# Patient Record
Sex: FEMALE | Race: WHITE | NOT HISPANIC OR LATINO | Employment: OTHER | URBAN - METROPOLITAN AREA
[De-identification: names, ages, dates, MRNs, and addresses within clinical notes are randomized per-mention and may not be internally consistent; named-entity substitution may affect disease eponyms.]

---

## 2018-06-06 ENCOUNTER — OFFICE VISIT (OUTPATIENT)
Dept: OPHTHALMOLOGY | Facility: CLINIC | Age: 73
End: 2018-06-06
Attending: OPHTHALMOLOGY
Payer: MEDICARE

## 2018-06-06 ENCOUNTER — TELEPHONE (OUTPATIENT)
Dept: OPHTHALMOLOGY | Facility: CLINIC | Age: 73
End: 2018-06-06

## 2018-06-06 DIAGNOSIS — H25.13 NUCLEAR SCLEROTIC CATARACT OF BOTH EYES: Primary | ICD-10-CM

## 2018-06-06 PROCEDURE — G0463 HOSPITAL OUTPT CLINIC VISIT: HCPCS | Mod: ZF

## 2018-06-06 ASSESSMENT — VISUAL ACUITY
OS_PH_SC: 20/150
OD_SC: HM
METHOD: SNELLEN - LINEAR
OS_SC: 20/400

## 2018-06-06 ASSESSMENT — EXTERNAL EXAM - RIGHT EYE: OD_EXAM: NORMAL

## 2018-06-06 ASSESSMENT — REFRACTION_MANIFEST
OS_SPHERE: -2.50
OS_AXIS: 075
OS_ADD: +2.75
OS_CYLINDER: +0.25
OD_ADD: +2.75

## 2018-06-06 ASSESSMENT — EXTERNAL EXAM - LEFT EYE: OS_EXAM: NORMAL

## 2018-06-06 ASSESSMENT — TONOMETRY
IOP_METHOD: ICARE
OD_IOP_MMHG: 10
OS_IOP_MMHG: 10

## 2018-06-06 ASSESSMENT — CONF VISUAL FIELD
OD_NORMAL: 1
OS_NORMAL: 1

## 2018-06-06 ASSESSMENT — SLIT LAMP EXAM - LIDS
COMMENTS: NORMAL
COMMENTS: NORMAL
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
## 2018-06-06 NOTE — TELEPHONE ENCOUNTER
Note to Dr. Early/facilitator for f/u confidential callback per pt request below  Devante Moncada RN 1:23 PM 06/06/18

## 2018-06-06 NOTE — LETTER
Dear ,      Thank you for coming to see me at the [facility].       I received your packet of medical records. In regards to your care, as discussed during your visit to my clinic on June 6th, I am not able to do surgery on your cataract as we do not accept your insurance.   Please find an ophthalmologist in your area who is able to perform your cataract surgery based upon my findings during your visit.      I have enclosed a release of information for your records once you are able to establish care with another ophthalmologist. If your insurance does change, please feel free to schedule another appointment.       Respectfully,

## 2018-06-06 NOTE — MR AVS SNAPSHOT
After Visit Summary   6/6/2018    Jada Latham    MRN: [id number]           Patient Information     Date Of Birth          1945        Visit Information        Provider Department      6/6/2018 7:30 AM Aletha Early MD [facility]        Today's Diagnoses     Nuclear sclerotic cataract of both eyes    -  1       Follow-ups after your visit        Follow-up notes from your care team     Return if symptoms worsen or fail to improve.      Who to contact     Please call your clinic at [phone] to:    Ask questions about your health    Make or cancel appointments    Discuss your medicines    Learn about your test results    Speak to your doctor            Additional Information About Your Visit        Care EveryWhere ID     This is your Care EveryWhere ID. This could be used by other organizations to access your [facility] medical records  [id number]         Blood Pressure from Last 3 Encounters:   No data found for BP    Weight from Last 3 Encounters:   No data found for Wt              Today, you had the following     No orders found for display       Primary Care Provider Fax #    Physician No Ref-Primary [phone]       No address on file        Equal Access to Services     RASTA VITALE : Hadii aad ku hadasho Soomaali, waaxda luqadaha, qaybta kaalmada adeegyada, waxay bonniein haychaz mustafa . [facility] [phone].    ATENCIÓN: Si habla español, tiene a da silva disposición servicios gratuitos de asistencia lingüística. Llame al [phone].    We comply with applicable federal civil rights laws and [place] laws. We do not discriminate on the basis of race, color, national origin, age, disability, sex, sexual orientation, or gender identity.            Thank you!     Thank you for choosing EYE CLINIC  for your care. Our goal is always to provide you with excellent care. Hearing back from our patients is one way we can continue to improve our services. Please take a few minutes  to complete the written survey that you may receive in the mail after your visit with us. Thank you!             Your Updated Medication List - Protect others around you: Learn how to safely use, store and throw away your medicines at www.disposemymeds.org.      Notice  As of 6/6/2018  8:56 AM    You have not been prescribed any medications.
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
## 2018-06-06 NOTE — TELEPHONE ENCOUNTER
[facility] Call Center    Phone Message    May a detailed message be left on voicemail: no    Reason for Call: Other: Pt was in to see Dr. Early and would like to talk to her. Would not tell me anything about what it is regarding, stating it was confidental. Pt is staying at a hotel and wants us to call at [phone] and ask for room 216. Thanks      Action Taken: Message routed to:  [facility] Clinic

## 2018-06-06 NOTE — LETTER
Dear Ms. Latham,      Thank you for coming to see me at the [facility].       I received your packet of medical records. In regards to your care, as discussed during your visit to my clinic on June 6th, I am not able to do surgery on your cataract as we do not accept your insurance.   Please find an ophthalmologist in your area who is able to perform your cataract surgery based upon my findings during your visit.    I have enclosed a release of information for your records once you are able to establish care with another ophthalmologist. If your insurance does change, please feel free to schedule another appointment.       Respectfully,         Dr. Aletha Early

## 2018-06-07 NOTE — TELEPHONE ENCOUNTER
[facility] Call Center    Phone Message    May a detailed message be left on voicemail: no    Reason for Call: Other: Pt called again requesting a call back before 10am      Action Taken: Message routed to:  [facility] EYE

## 2018-06-07 NOTE — TELEPHONE ENCOUNTER
Spoke to pt at [phone]    Pt has 8 requirements before scheduling cataract     States needs to review with Dr. early     Pt has list should would like to review      Pt will be at hotel until tomorrow AM likely and would prefer call thru them rather than home number later on    Prefers this afternoon/evening     Note to Dr. Early to assist in callback    Devante Moncada RN 9:56 AM 06/07/18    [facility] number [phone] and ask for Room #216

## 2018-06-07 NOTE — TELEPHONE ENCOUNTER
[facility] Call Center    Phone Message    May a detailed message be left on voicemail: no - Pt staying in hotel and need to request room 216    Reason for Call: Other: Pt following up on message left yesterday. Hasn't heard back from Dr. Early and is requesting call back before she leaves [facility] today at 10:00a. Please call her at her hotel at [phone] and ask for Room #216.     Action Taken: Message routed to:  [facility] Clinic

## 2018-06-13 ENCOUNTER — TELEPHONE (OUTPATIENT)
Dept: OPHTHALMOLOGY | Facility: CLINIC | Age: 73
End: 2018-06-13

## 2018-06-13 NOTE — TELEPHONE ENCOUNTER
[facility] Call Center    Phone Message    May a detailed message be left on voicemail: yes    Reason for Call: Other: Pt said she sent a package to Dr Early and wants to make sure she gets it. If she doesn't get a package by tomramez, please call Pt to let her know. Pt mailed it to the wrong address, she mailed it to the physical address for the Eye Clinic, and not the mailing address for the eye clinic. She requests a call back only if you do not receive the package. Thanks!     Action Taken: Message routed to:  [facility] Clinic

## 2018-06-14 ENCOUNTER — TELEPHONE (OUTPATIENT)
Dept: OPHTHALMOLOGY | Facility: CLINIC | Age: 73
End: 2018-06-14

## 2018-06-14 NOTE — TELEPHONE ENCOUNTER
[facility] Call Center    Phone Message    May a detailed message be left on voicemail: yes    Reason for Call: Other: Pt would like to schedule consult for cataract surgery, as well as surgery and post-op. Pt is requesting soonest possible and all as close together.     Action Taken: Message routed to:  [facility] Ophthamology

## 2018-06-14 NOTE — TELEPHONE ENCOUNTER
Note to surgery scheduler for scheduling cataract surgery  Devante Moncada RN 4:54 PM 06/14/18

## 2019-07-17 ENCOUNTER — TELEPHONE (OUTPATIENT)
Dept: OPHTHALMOLOGY | Facility: CLINIC | Age: 74
End: 2019-07-17
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
## 2019-09-11 ENCOUNTER — TRANSFERRED RECORDS (OUTPATIENT)
Dept: HEALTH INFORMATION MANAGEMENT | Facility: CLINIC | Age: 74
End: 2019-09-11

## 2019-11-07 ENCOUNTER — TRANSFERRED RECORDS (OUTPATIENT)
Dept: HEALTH INFORMATION MANAGEMENT | Facility: CLINIC | Age: 74
End: 2019-11-07

## 2020-01-13 DIAGNOSIS — H26.8 MATURE CATARACT: Primary | ICD-10-CM

## 2020-01-14 ENCOUNTER — OFFICE VISIT (OUTPATIENT)
Dept: OPHTHALMOLOGY | Facility: CLINIC | Age: 75
End: 2020-01-14
Attending: OPHTHALMOLOGY
Payer: COMMERCIAL

## 2020-01-14 DIAGNOSIS — H26.8 MATURE CATARACT: Primary | ICD-10-CM

## 2020-01-14 DIAGNOSIS — H25.812 COMBINED FORM OF AGE-RELATED CATARACT, LEFT EYE: ICD-10-CM

## 2020-01-14 DIAGNOSIS — H26.8 MATURE CATARACT: ICD-10-CM

## 2020-01-14 PROCEDURE — G0463 HOSPITAL OUTPT CLINIC VISIT: HCPCS | Mod: ZF

## 2020-01-14 PROCEDURE — 76512 OPH US DX B-SCAN: CPT | Mod: ZF | Performed by: OPHTHALMOLOGY

## 2020-01-14 PROCEDURE — 76519 ECHO EXAM OF EYE: CPT | Mod: ZF | Performed by: OPHTHALMOLOGY

## 2020-01-14 ASSESSMENT — EXTERNAL EXAM - LEFT EYE: OS_EXAM: NORMAL

## 2020-01-14 ASSESSMENT — SLIT LAMP EXAM - LIDS
COMMENTS: NORMAL
COMMENTS: NORMAL

## 2020-01-14 ASSESSMENT — CONF VISUAL FIELD
OD_SUPERIOR_NASAL_RESTRICTION: 1
OS_SUPERIOR_NASAL_RESTRICTION: 1
OS_INFERIOR_NASAL_RESTRICTION: 1
OS_SUPERIOR_TEMPORAL_RESTRICTION: 1
OS_INFERIOR_TEMPORAL_RESTRICTION: 1
METHOD: COUNTING FINGERS
OD_SUPERIOR_TEMPORAL_RESTRICTION: 1
OD_INFERIOR_NASAL_RESTRICTION: 1
OD_INFERIOR_TEMPORAL_RESTRICTION: 1

## 2020-01-14 ASSESSMENT — TONOMETRY
OS_IOP_MMHG: 14
IOP_METHOD: TONOPEN
OD_IOP_MMHG: 12

## 2020-01-14 ASSESSMENT — VISUAL ACUITY
METHOD: SNELLEN - LINEAR
OD_SC: HM

## 2020-01-14 ASSESSMENT — EXTERNAL EXAM - RIGHT EYE: OD_EXAM: NORMAL

## 2020-01-14 NOTE — PROGRESS NOTES
LEANN  Jada Latham is a 74 year old female here for evaluation of blurred vision. She states her vision has progressively been getting worse over the last several years. A few years ago, her vision was ok to drive, but it is has not been good enough to drive for at least hte last 2 years. She denies pain, redness, discharge. No flashes/floaters.    POH: No history of eye surgery  PMH: Bipolar/schizoaffective  FH: Mother with floaters    Assessment & Plan      (H25.13) Nuclear sclerotic cataract of both eyes  (primary encounter diagnosis)  Comment: Visually significant with BCVA of HM right eye and CF left eye with mature cataract right eye and very dense advanced NS and PSC left eye. B-scan echography done today both eyes due to no view to posterior segment right eye and poor view left eye. Retina is attached and no masses. Question of optic nerve cupping left eye.     Dilates to: 7.5 mm  Alpha blockers/Flomax: None  Trauma/Pseudoxfoliation: None  Fuchs dystrophy/guttae: None    Diabetes: No  Anticoagulation: None    Cyl: No toric    We discussed the risks and benefits of cataract surgery in the right eye, and informed consent was obtained. Discussed that without a view to the posterior segment of the eye, retina and optic nerve issues may limit her vision after surgery despite cataract removal.    Proceed with CE/IOL right eye to start. Needs left eye as well, but will consider after right eye done and healed.    Surgical plan:  RB/MAC (due to psychiatric history and very dense mature lens)  Trypan    She has no one to be with her or stay with her for 24 hours after surgery.  I talked with Social Work who is going to start looking into options for her. If inpatient observation is an option, can schedule her surgery at [facility].     -----------------------------------------------------------------------------------    Patient disposition:   Return for scheduled procedure.    Teaching statement:  Complete  documentation of historical and exam elements from today's encounter can be found in the full encounter summary report (not reduplicated in this progress note). I personally obtained the chief complaint(s) and history of present illness.  I confirmed and edited as necessary the review of systems, past medical/surgical history, family history, social history, and examination findings as documented by others; and I examined the patient myself. I personally reviewed the relevant tests, images, and reports as documented above.     I formulated and edited as necessary the assessment and plan and discussed the findings and management plan with the patient and family.    Aletha Early MD  Comprehensive Ophthalmology & Ocular Pathology  Department of Ophthalmology and Visual Neurosciences  [email or web address]  Pager [phone]

## 2020-01-14 NOTE — NURSING NOTE
Chief Complaints and History of Present Illnesses   Patient presents with     Cataract     Chief Complaint(s) and History of Present Illness(es)     Cataract     Laterality: both eyes    Associated symptoms: blurred vision.  Negative for glare and haloes    Severity: severe    Frequency: constant    Course: gradually worsening    Treatments tried: no treatments              Comments     Pt complains of not being able to see much out of her RE, LE gradually decreasing.  Denies any glare, halos, flashes, pain, pressure, irritation, discharge, and tearing.  Does not use any eye medications at this time.    Slime Muñoz OT 11:15 AM January 14, 2020

## 2020-01-17 ENCOUNTER — TELEPHONE (OUTPATIENT)
Dept: OPHTHALMOLOGY | Facility: CLINIC | Age: 75
End: 2020-01-17

## 2020-01-20 ENCOUNTER — TELEPHONE (OUTPATIENT)
Dept: OPHTHALMOLOGY | Facility: CLINIC | Age: 75
End: 2020-01-20

## 2020-01-20 ENCOUNTER — HOSPITAL ENCOUNTER (OUTPATIENT)
Facility: CLINIC | Age: 75
End: 2020-01-20
Attending: OPHTHALMOLOGY | Admitting: OPHTHALMOLOGY
Payer: COMMERCIAL

## 2020-01-20 DIAGNOSIS — H26.8 MATURE CATARACT: ICD-10-CM

## 2020-01-20 DIAGNOSIS — H25.13 AGE-RELATED NUCLEAR CATARACT OF BOTH EYES: Primary | ICD-10-CM

## 2020-01-20 NOTE — TELEPHONE ENCOUNTER
Patient is scheduled for surgery with Dr. Dr. Aletha Early     Spoke with: Jada     Date of Surgery: 02/06/20     Location: [facility]     Informed patient they will need an adult : Yes     H&P will be completed at: [facility] Clinic 01/21     Post Op scheduled on 02/24/20      Surgery packet was given in clinic     Additional comments:Provided [facility] Pre-Admission RN's number ([phone],) pt is skeptical of technology and does not accept incoming calls, so she can contact them 1 - 2 business days prior for arrival time and instructions.

## 2020-01-20 NOTE — TELEPHONE ENCOUNTER
FUTURE VISIT INFORMATION      SURGERY INFORMATION:    Date: 2.6.2020    Location: [facility]    Surgeon: Dr. Early    Anesthesia Type combines MAC with Retrobulbar     Procedure: right cataract removal with intraocular lens implant    Consult: 1.14.2020- OV with Dr. Early        RECORDS REQUESTED FROM:       Primary Care Provider:    Most recent EKG with tracings/strips: 7.10.12-report in Care Everywhere

## 2020-01-21 ENCOUNTER — PRE VISIT (OUTPATIENT)
Dept: SURGERY | Facility: CLINIC | Age: 75
End: 2020-01-21

## 2020-01-21 ENCOUNTER — OFFICE VISIT (OUTPATIENT)
Dept: SURGERY | Facility: CLINIC | Age: 75
End: 2020-01-21

## 2020-01-21 ENCOUNTER — ANESTHESIA EVENT (OUTPATIENT)
Dept: SURGERY | Facility: CLINIC | Age: 75
End: 2020-01-21

## 2020-01-21 VITALS
WEIGHT: 219.5 LBS | BODY MASS INDEX: 36.57 KG/M2 | HEIGHT: 65 IN | HEART RATE: 64 BPM | SYSTOLIC BLOOD PRESSURE: 123 MMHG | DIASTOLIC BLOOD PRESSURE: 75 MMHG | TEMPERATURE: 98.3 F | RESPIRATION RATE: 18 BRPM | OXYGEN SATURATION: 93 %

## 2020-01-21 DIAGNOSIS — Z01.818 PREOP EXAMINATION: Primary | ICD-10-CM

## 2020-01-21 PROBLEM — H52.13 MYOPIA OF BOTH EYES WITH ASTIGMATISM AND PRESBYOPIA: Status: ACTIVE | Noted: 2018-02-25

## 2020-01-21 PROBLEM — H52.203 MYOPIA OF BOTH EYES WITH ASTIGMATISM AND PRESBYOPIA: Status: ACTIVE | Noted: 2018-02-25

## 2020-01-21 PROBLEM — H04.129 DRY EYES DUE TO DECREASED TEAR PRODUCTION: Status: ACTIVE | Noted: 2017-12-21

## 2020-01-21 PROBLEM — H25.9 AGE-RELATED CATARACT OF BOTH EYES: Status: ACTIVE | Noted: 2017-06-27

## 2020-01-21 PROBLEM — R73.03 PREDIABETES: Status: ACTIVE | Noted: 2018-05-01

## 2020-01-21 PROBLEM — H52.4 MYOPIA OF BOTH EYES WITH ASTIGMATISM AND PRESBYOPIA: Status: ACTIVE | Noted: 2018-02-25

## 2020-01-21 RX ORDER — IBUPROFEN 200 MG
400 TABLET ORAL EVERY 6 HOURS PRN
COMMUNITY
End: 2021-07-21

## 2020-01-21 ASSESSMENT — MIFFLIN-ST. JEOR: SCORE: 1496.53

## 2020-01-21 NOTE — ANESTHESIA PREPROCEDURE EVALUATION
Anesthesia Pre-Procedure Evaluation    Patient: Jada Latham   MRN:     [id number] Gender:   female   Age:    74 year old :      1945        Preoperative Diagnosis: * No surgery found *        No past medical history on file.   No past surgical history on file.       Anesthesia Evaluation     . Pt has had prior anesthetic. Type of anesthetic: only anesthesia was as a child, 4 yo, tonsillectomy.    No history of anesthetic complications          ROS/MED HX    ENT/Pulmonary:  - neg pulmonary ROS     Neurologic:  - neg neurologic ROS     Cardiovascular:  - neg cardiovascular ROS   (+) ----. : . . . :. . Previous cardiac testing date:results:date: results:ECG reviewed date: results: date: results:          METS/Exercise Tolerance:  >4 METS   Hematologic:  - neg hematologic  ROS       Musculoskeletal:   (+) fracture lower extremity: Ankle, -       GI/Hepatic:  - neg GI/hepatic ROS       Renal/Genitourinary:  - ROS Renal section negative       Endo:     (+) Obesity, .      Psychiatric:     (+) psychiatric history other (comment) (schizoaffective disorder/bipolar disorder)      Infectious Disease:  - neg infectious disease ROS       Malignancy:      - no malignancy   Other:    (+) No chance of pregnancy no H/O Chronic Pain,                       PHYSICAL EXAM:   Mental Status/Neuro: A/A/O; Age Appropriate   Airway: Facies: Feasible  Mallampati: II  Mouth/Opening: Full  TM distance: > 6 cm  Neck ROM: Full   Respiratory: Auscultation: CTAB     Resp. Rate: Normal     Resp. Effort: Normal      CV: Rhythm: Regular  Rate: Age appropriate  Heart: Normal Sounds  Edema: None   Comments:      Dental: Normal Dentition                LABS:  CBC: No results found for: WBC, HGB, HCT, PLT  BMP: No results found for: NA, POTASSIUM, CHLORIDE, CO2, BUN, CR, GLC  COAGS: No results found for: PTT, INR, FIBR  POC: No results found for: BGM, HCG, HCGS  OTHER: No results found for: PH, LACT, A1C, GOMEZ, PHOS, MAG, ALBUMIN,  PROTTOTAL, ALT, AST, GGT, ALKPHOS, BILITOTAL, BILIDIRECT, LIPASE, AMYLASE, PRADEEP, TSH, T4, T3, CRP, SED     Preop Vitals    BP Readings from Last 3 Encounters:   No data found for BP    Pulse Readings from Last 3 Encounters:   No data found for Pulse      Resp Readings from Last 3 Encounters:   No data found for Resp    SpO2 Readings from Last 3 Encounters:   No data found for SpO2      Temp Readings from Last 1 Encounters:   No data found for Temp    Ht Readings from Last 1 Encounters:   No data found for Ht      Wt Readings from Last 1 Encounters:   No data found for Wt    There is no height or weight on file to calculate BMI.     LDA:        JUAN MANUEL FV AN PLAN NO PONV RULE       PAC Discussion and Assessment    ASA Classification: 2  Case is suitable for: [place]  Anesthetic techniques and relevant risks discussed: MAC with GA as backup  Invasive monitoring and risk discussed: No  Types:   Possibility and Risk of blood transfusion discussed: No  NPO instructions given:   Additional anesthetic preparation and risks discussed:   Needs early admission to pre-op area:   Other:     PAC Resident/NP Anesthesia Assessment:  Jada Latham is a 74 year old female scheduled for RIGHT CATARACT REMOVAL WITH INTRAOCULAR LENS IMPLANT on 2/6/2020 by Dr. Early in treatment of mature cataract.  PAC referral for risk assessment and optimization for anesthesia with comorbid conditions of obesity:    Pre-operative considerations:  1.  Cardiac:  Functional status- METS >4.  Pt states she walks most places and approximately 2 hours per day.  Low risk surgery with 0.4% (RCRI 0) risk of major adverse cardiac event.   -denies CP, SOB, CHAVEZ, palpitations, lightheadedness, syncope  -denies cardiac history    2.  Pulm:  Airway feasible.  CHRISTOPHER risk:  Low, STOPBANG 2/8  -former smoker    3.  GI:  Risk of PONV score = 2.  If > 2, anti-emetic intervention recommended.    VTE risk: 0.5%         **For further details of assessment, testing, and  physical exam please see H and P completed on same date.          Sarai Coy PA-C, [facility]      Reviewed and Signed by PAC Mid-Level Provider/Resident  Mid-Level Provider/Resident: Sarai Coy  Date: 1/21/2020  Time:     Attending Anesthesiologist Anesthesia Assessment:        Anesthesiologist:   Date:   Time:   Pass/Fail:   Disposition:     PAC Pharmacist Assessment:        Pharmacist:   Date:   Time:    Sarai Coy PA-C

## 2020-01-21 NOTE — PATIENT INSTRUCTIONS
Preparing for Your Surgery      Name:  Jada Latham   MRN:  [id number]   :  1945   Today's Date:  2020     Arriving for surgery:   Surgery date:   2020  Arrival time:  5:30 am  Please come to:     [facility] [address]  [address] [place]  [address]    - parking is available in front of [facility] from 5:15AM to 8:00PM. If you prefer, park your car in the Green Lot.    -Proceed to the 3rd floor, check in at the Adult Surgery Waiting Lounge. [phone]    If an escort is needed stop at the Information Desk in the lobby. Inform the information person that you are here for surgery. An escort to the Adult Surgery Waiting Lounge will be provided.    What can I eat or drink?  -  You may have solid food or milk products until 8 hours prior to your surgery.(20 at 11 pm)  -  You may have water, apple juice or 7up/Sprite until 2 hours prior to your surgery.(unyil 5:30 am arrival time)    Which medicines can I take?          Stop Aspirin, vitamins and supplements one week prior to surgery.          Hold Ibuprofen for 24 hours and/or Naproxen for 48 hours prior to surgery.       -  Please take these medications the day of surgery:  NONE      How do I prepare myself?  -  Take two showers: one the night before surgery; and one the morning of surgery.         Use Scrubcare or Hibiclens to wash from neck down, leave soap on your skin for up to one minute.       Do not get soap in your eyes or ears.  You may use your own shampoo and conditioner; no other hair products.   -  Do NOT use lotion, powder, deodorant, or antiperspirant the day of your surgery.  -  Do NOT wear any makeup, fingernail polish or jewelry.  - Do not bring your own medications to the hospital, except for inhalers and eye   drops.  -  Bring your ID and insurance card.      -If you are scheduled to go home the Same Day as surgery you must have a responsible adult as a   and to stay with you overnight the first 24 hours after surgery.     Questions or Concerns:    -Please call the Pre Admission Nursing Office at [phone].       -If you have health changes between today and your surgery please call your surgeon.     For questions after surgery please call your surgeons office.
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
## 2020-01-30 ENCOUNTER — DOCUMENTATION ONLY (OUTPATIENT)
Dept: CARE COORDINATION | Facility: CLINIC | Age: 75
End: 2020-01-30

## 2020-01-30 NOTE — PROGRESS NOTES
Social Work Documentation-Only Note  [facility]    Patient Name:  Jdaa Latham  /Age:  1945 (74 year old)    Referral Source: Dr. Early in Ophthalmology Clinic   Reason for Referral:  Pre-surgery planning     Received message from MD that pt needs an eye surgery but does not have any supports for after the surgery. Pt did not want to be called by SW so was given SW contact info to call to discuss further. Did not hear from pt. Later received message from perioperative coordinator who said that pt did not want SW intervention, does not trust people calling her and would also not trust a [employer] cab to pick her up. She suggested letter outreach offering SW contact info. Sent letter to pt with SW contact info if she would like to discuss further.     LALI Nunes, [facility]  Clinical   Outpatient Speciality Clinics   [facility] [place]  Ph. [phone]    NO LETTER

## 2020-01-31 ENCOUNTER — PREP FOR PROCEDURE (OUTPATIENT)
Dept: OPHTHALMOLOGY | Facility: CLINIC | Age: 75
End: 2020-01-31

## 2020-02-05 ENCOUNTER — ANESTHESIA EVENT (OUTPATIENT)
Dept: SURGERY | Facility: CLINIC | Age: 75
End: 2020-02-05

## 2020-02-05 RX ORDER — DICLOFENAC SODIUM 1 MG/ML
1 SOLUTION/ DROPS OPHTHALMIC
Status: CANCELLED | OUTPATIENT
Start: 2020-02-05

## 2020-02-05 RX ORDER — SODIUM CHLORIDE, SODIUM LACTATE, POTASSIUM CHLORIDE, CALCIUM CHLORIDE 600; 310; 30; 20 MG/100ML; MG/100ML; MG/100ML; MG/100ML
INJECTION, SOLUTION INTRAVENOUS CONTINUOUS
Status: CANCELLED | OUTPATIENT
Start: 2020-02-05

## 2020-02-05 RX ORDER — OFLOXACIN 3 MG/ML
1 SOLUTION/ DROPS OPHTHALMIC
Status: CANCELLED | OUTPATIENT
Start: 2020-02-05

## 2020-02-05 RX ORDER — CYCLOPENTOLAT/TROPIC/PHENYLEPH 1%-1%-2.5%
1 DROPS (EA) OPHTHALMIC (EYE)
Status: CANCELLED | OUTPATIENT
Start: 2020-02-05

## 2020-02-05 NOTE — ANESTHESIA PREPROCEDURE EVALUATION
Anesthesia Pre-Procedure Evaluation    Patient: Jada Latham   MRN:     [id number] Gender:   female   Age:    74 year old :      1945        Preoperative Diagnosis: Mature cataract [H26.8]   Procedure(s):  RIGHT CATARACT REMOVAL WITH INTRAOCULAR LENS IMPLANT  Possible Left Eye Cataract Extraction With Intraocular Lens Placement     No past medical history on file.   Past Surgical History:   Procedure Laterality Date     TONSILLECTOMY      as a child          Anesthesia Evaluation    ROS/Med Hx    No history of anesthetic complications    Cardiovascular Findings - negative ROS    Neuro Findings   Comments: Restless legs syndrome (RLS)  Schizoaffective disorder, bipolar type (H)    Pulmonary Findings - negative ROS    HENT Findings   Comments: Mature cataract  Myopia of both eyes with astigmatism and presbyopia  Nuclear sclerosis        Skin Findings - negative skin ROS      GI/Hepatic/Renal Findings - negative ROS    Endocrine/Metabolic Findings - negative ROS      Genetic/Syndrome Findings - negative genetics/syndromes ROS    Hematology/Oncology Findings - negative hematology/oncology ROS    Additional Notes  Morbid obesity (H)      JZG FV AN PHYSICAL EXAM      LABS:  CBC: No results found for: WBC, HGB, HCT, PLT  BMP: No results found for: NA, POTASSIUM, CHLORIDE, CO2, BUN, CR, GLC  COAGS: No results found for: PTT, INR, FIBR  POC: No results found for: BGM, HCG, HCGS  OTHER: No results found for: PH, LACT, A1C, GMOEZ, PHOS, MAG, ALBUMIN, PROTTOTAL, ALT, AST, GGT, ALKPHOS, BILITOTAL, BILIDIRECT, LIPASE, AMYLASE, PRADEEP, TSH, T4, T3, CRP, SED     Preop Vitals    BP Readings from Last 3 Encounters:   20 123/75    Pulse Readings from Last 3 Encounters:   20 64      Resp Readings from Last 3 Encounters:   20 18    SpO2 Readings from Last 3 Encounters:   20 93%      Temp Readings from Last 1 Encounters:   20 36.8  C (98.3  F) (Oral)    Ht Readings from Last 1 Encounters:   20  "1.651 m (5' 5\")      Wt Readings from Last 1 Encounters:   01/21/20 99.6 kg (219 lb 8 oz)    Estimated body mass index is 36.53 kg/m  as calculated from the following:    Height as of 1/21/20: 1.651 m (5' 5\").    Weight as of 1/21/20: 99.6 kg (219 lb 8 oz).     LDA:        Assessment:   ASA SCORE: 2    H&P: History and physical reviewed and following examination; no interval change.         Plan:   Anes. Type:  MAC   Pre-Medication: None   Induction:  IV (Standard)   Airway: Native Airway   Access/Monitoring: PIV   Maintenance: N/a     Postop Plan:   Postop Pain: None  Postop Sedation/Airway: Not planned  Disposition: Outpatient     PONV Management: Adult Risk Factors: Female     CONSENT:      Blood Products: Consent Deferred (Minimal Blood Loss)       Comments for Plan/Consent:  73 yo for  RIGHT CATARACT REMOVAL WITH INTRAOCULAR LENS IMPLANT (Right Eye) Possible Left Eye Cataract Extraction With Intraocular Lens Placement (Left Eye) under MAC/GA backup. Anesthesia risks and benefits discussed. Questions answered. Parents understand and agree to proceed with anesthesia plan.            Dominick Domínguez MD  "

## 2020-02-06 ENCOUNTER — ANESTHESIA (OUTPATIENT)
Dept: SURGERY | Facility: CLINIC | Age: 75
End: 2020-02-06

## 2020-02-19 ENCOUNTER — TELEPHONE (OUTPATIENT)
Dept: OPHTHALMOLOGY | Facility: CLINIC | Age: 75
End: 2020-02-19

## 2021-04-19 ENCOUNTER — TRANSFERRED RECORDS (OUTPATIENT)
Dept: HEALTH INFORMATION MANAGEMENT | Facility: CLINIC | Age: 76
End: 2021-04-19

## 2021-05-21 ENCOUNTER — PATIENT OUTREACH (OUTPATIENT)
Dept: CARE COORDINATION | Facility: CLINIC | Age: 76
End: 2021-05-21

## 2021-05-21 NOTE — PROGRESS NOTES
Social Work Intervention  [facility]    Data/Intervention:    Patient Name:  Jada Latham  /Age:  1945 (75 year old)    Visit Type: MyChart   Referral Source: Eye clinic   Reason for Referral: Contacted by [facility] regarding Patient     Collaborated With:    -[facility], [facility] [phone]    Patient Concerns/Issues:   SW contacted by [facility] at [facility] regarding Patient to provide information and update. Patient has an upcoming scheduled appointment on  for a cataract evaluation. She was seen in the Eye Clinic in 2020 and an eye surgery was unable to be scheduled due to not having support for 24 hours post op. Patient will be staying at [facility] beginning 2021. She is currently on a civil commitment and her  is Olivia Velasco.    [address] [place] [address]    Olivia Velasco  Civil Commitment   [phone]    Intervention/Education/Resources Provided:  -Provided update to provider    Assessment/Plan:  Sent an inbox to Dr. Early to provide update on Patient. Patient will be residing at [facility] beginning 21. Patient is currently taking Haldol in order to manage her symptoms.       LALI Quiroga, LGSW  [facility] [place]   Phone: [phone]

## 2021-06-30 ENCOUNTER — OFFICE VISIT (OUTPATIENT)
Dept: OPHTHALMOLOGY | Facility: CLINIC | Age: 76
End: 2021-06-30
Attending: OPHTHALMOLOGY
Payer: COMMERCIAL

## 2021-06-30 DIAGNOSIS — H04.123 DRY EYES, BILATERAL: ICD-10-CM

## 2021-06-30 DIAGNOSIS — H26.8 MATURE CATARACT: Primary | ICD-10-CM

## 2021-06-30 PROCEDURE — 99214 OFFICE O/P EST MOD 30 MIN: CPT | Performed by: OPHTHALMOLOGY

## 2021-06-30 PROCEDURE — G0463 HOSPITAL OUTPT CLINIC VISIT: HCPCS | Mod: 25

## 2021-06-30 PROCEDURE — G0463 HOSPITAL OUTPT CLINIC VISIT: HCPCS

## 2021-06-30 PROCEDURE — 76519 ECHO EXAM OF EYE: CPT | Performed by: OPHTHALMOLOGY

## 2021-06-30 RX ORDER — SENNOSIDES A AND B 8.6 MG/1
8.6 TABLET, FILM COATED ORAL 2 TIMES DAILY PRN
COMMUNITY
Start: 2021-05-21 | End: 2023-08-16

## 2021-06-30 RX ORDER — DIPHENHYDRAMINE HCL 50 MG
50 CAPSULE ORAL AT BEDTIME
COMMUNITY
Start: 2021-05-21 | End: 2023-08-16

## 2021-06-30 RX ORDER — HALOPERIDOL DECANOATE 50 MG/ML
200 INJECTION INTRAMUSCULAR
COMMUNITY
Start: 2021-06-14 | End: 2023-08-16

## 2021-06-30 ASSESSMENT — CONF VISUAL FIELD
OS_SUPERIOR_TEMPORAL_RESTRICTION: 1
OS_SUPERIOR_NASAL_RESTRICTION: 1
OD_SUPERIOR_TEMPORAL_RESTRICTION: 1
OD_INFERIOR_NASAL_RESTRICTION: 1
OS_INFERIOR_NASAL_RESTRICTION: 1
OD_SUPERIOR_NASAL_RESTRICTION: 1
OD_INFERIOR_TEMPORAL_RESTRICTION: 1
OS_INFERIOR_TEMPORAL_RESTRICTION: 1

## 2021-06-30 ASSESSMENT — VISUAL ACUITY
OS_SC: HM
METHOD: SNELLEN - LINEAR
OD_SC: HM

## 2021-06-30 ASSESSMENT — SLIT LAMP EXAM - LIDS
COMMENTS: NORMAL
COMMENTS: NORMAL

## 2021-06-30 ASSESSMENT — EXTERNAL EXAM - LEFT EYE: OS_EXAM: NORMAL

## 2021-06-30 ASSESSMENT — TONOMETRY
IOP_METHOD: TONOPEN
OD_IOP_MMHG: 5
OS_IOP_MMHG: 8

## 2021-06-30 ASSESSMENT — EXTERNAL EXAM - RIGHT EYE: OD_EXAM: NORMAL

## 2021-06-30 NOTE — PROGRESS NOTES
HPI  Jada Lahtam is a 75 year old female here for repeat cataract evaluation. She states her vision has progressively been getting worse over the last several years, and she can no longer see . A few years ago, her vision was ok to drive, but it is has not been good enough to drive for at least hte last 2 years. She denies pain, redness, discharge. No flashes/floaters.    POH: No history of eye surgery  PMH: Bipolar/schizoaffective  FH: Mother with floaters    Assessment & Plan    (H26.8) Mature cataract, both eyes  Comment: Visually significant with BCVA of HM in both eyes with mature cataract both eyes. B-scan echography done previously both eyes due to no view to posterior segment. Retina is attached and no masses. Question of optic nerve cupping left eye.     Dilates to: 7.5 mm  Alpha blockers/Flomax: None  Trauma/Pseudoxfoliation: None  Fuchs dystrophy/guttae: None    Diabetes: No  Anticoagulation: None    Cyl: No toric    We discussed the risks and benefits of cataract surgery, and informed consent was obtained. Discussed that without a view to the posterior segment of the eye, retina and optic nerve issues may limit her vision after surgery despite cataract removal. Discussed increased risk of needing multiple surgeries given mature white cataract.     Proceed with CE/IOL right eye to start.    Surgical plan:  RB/MAC (due to psychiatric history and very dense mature lens)  Trypan  Aim emmetropia  FACULTY    (H04.123) Dry eyes, bilateral  Comment: Very irregular ocular surface today  Plan: PFATs 3-4 x daily (ordered)      -----------------------------------------------------------------------------------    Patient disposition:   Return for scheduled procedure or sooner as needed.    Teaching statement:  Complete documentation of historical and exam elements from today's encounter can be found in the full encounter summary report (not reduplicated in this progress note). I personally obtained the chief  complaint(s) and history of present illness.  I confirmed and edited as necessary the review of systems, past medical/surgical history, family history, social history, and examination findings as documented by others; and I examined the patient myself. I personally reviewed the relevant tests, images, and reports as documented above.     I formulated and edited as necessary the assessment and plan and discussed the findings and management plan with the patient and family.    Aletha Early MD  Comprehensive Ophthalmology & Ocular Pathology  Department of Ophthalmology and Visual Neurosciences  [email or web address]  Pager [phone]
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
## 2021-07-07 ENCOUNTER — TELEPHONE (OUTPATIENT)
Dept: OPHTHALMOLOGY | Facility: CLINIC | Age: 76
End: 2021-07-07

## 2021-07-07 NOTE — TELEPHONE ENCOUNTER
Will forward to surgery scheduler to confirm with patient.      Roxana Dao on 7/7/2021 at 5:19 PM

## 2021-07-07 NOTE — TELEPHONE ENCOUNTER
Health Call Center    Phone Message    May a detailed message be left on voicemail: yes     Reason for Call: Other: Galilea along with Mary at [facility] called to confirm surgery appointments on  7/9/2021 and 7/21/2021 with Dr. Early. Writer did not see anything schedule for procedures. Routing to clinic for review and to contact Nursing Home. Can ask for Galilea or Mary.    Action Taken: Message routed to:  Clinics [facility] Surgery Center ([facility]): [facility] OPHTHALMOLOGY ADULT [facility] [[id number]]    Travel Screening: Not Applicable

## 2021-07-08 ENCOUNTER — TELEPHONE (OUTPATIENT)
Dept: OPHTHALMOLOGY | Facility: CLINIC | Age: 76
End: 2021-07-08

## 2021-07-08 DIAGNOSIS — Z11.59 ENCOUNTER FOR SCREENING FOR OTHER VIRAL DISEASES: ICD-10-CM

## 2021-07-08 NOTE — TELEPHONE ENCOUNTER
Patient is scheduled for surgery with Dr. Aletha Early     Spoke with: Mary     Date of Surgery: 07/23 and 08/06     Location: [facility] Center:  [address] [place] [address]     Informed patient they will need an adult : Yes     H&P will be completed at: PAC       COVID testing: [facility] 07/21 and 08/04     Post Op scheduled on 08/11 and 08/25     Surgery packet was mailed and faxed 07/08 (Fax: [phone])     Additional comments: Advised RN will call 1 - 2 business days prior with arrival time and instructions

## 2021-07-08 NOTE — TELEPHONE ENCOUNTER
Patient is scheduled for surgery with Dr. Aletha Early     Spoke with: Mary     Date of Surgery: 07/23 and 08/06     Location: [facility]:  [address] [place] [address]     Informed patient they will need an adult : Yes     H&P will be completed at: PAC      COVID testing: [facility] 07/21 and 08/04    Post Op scheduled on 08/11 and 08/25     Surgery packet was mailed and faxed 07/08 (Fax: [phone])     Additional comments: Advised RN will call 1 - 2 business days prior with arrival time and instructions.

## 2021-07-09 NOTE — TELEPHONE ENCOUNTER
FUTURE VISIT INFORMATION      SURGERY INFORMATION:    Date: 21    Location:  OR    Surgeon:  Aletha Early MD    Anesthesia Type:  Combined MAC with Retrobulbar    Procedure: LEFT EYE CATARACT REMOVAL WITH INTRAOCULAR LENS IMPLANT    Consult: ov 21    RECORDS REQUESTED FROM:       Primary Care Provider: Julieta Hebert NP    Most recent EKG+ Tracin/10/12- [facility]

## 2021-07-20 DIAGNOSIS — H25.811 COMBINED FORM OF AGE-RELATED CATARACT, RIGHT EYE: Primary | ICD-10-CM

## 2021-07-20 DIAGNOSIS — H26.8 MATURE CATARACT: ICD-10-CM

## 2021-07-20 RX ORDER — OFLOXACIN 3 MG/ML
SOLUTION/ DROPS OPHTHALMIC
Qty: 5 ML | Refills: 0 | Status: SHIPPED | OUTPATIENT
Start: 2021-07-20 | End: 2021-07-23

## 2021-07-20 RX ORDER — PREDNISOLONE ACETATE 10 MG/ML
SUSPENSION/ DROPS OPHTHALMIC
Qty: 5 ML | Refills: 1 | Status: SHIPPED | OUTPATIENT
Start: 2021-07-20 | End: 2021-07-23

## 2021-07-21 ENCOUNTER — ANESTHESIA EVENT (OUTPATIENT)
Dept: SURGERY | Facility: AMBULATORY SURGERY CENTER | Age: 76
End: 2021-07-21
Payer: COMMERCIAL

## 2021-07-21 ENCOUNTER — LAB (OUTPATIENT)
Dept: LAB | Facility: CLINIC | Age: 76
End: 2021-07-21
Attending: OPHTHALMOLOGY
Payer: MEDICARE

## 2021-07-21 ENCOUNTER — PRE VISIT (OUTPATIENT)
Dept: SURGERY | Facility: CLINIC | Age: 76
End: 2021-07-21

## 2021-07-21 ENCOUNTER — OFFICE VISIT (OUTPATIENT)
Dept: SURGERY | Facility: CLINIC | Age: 76
End: 2021-07-21
Payer: MEDICARE

## 2021-07-21 VITALS
RESPIRATION RATE: 16 BRPM | TEMPERATURE: 97.8 F | BODY MASS INDEX: 31.86 KG/M2 | HEIGHT: 67 IN | HEART RATE: 62 BPM | DIASTOLIC BLOOD PRESSURE: 57 MMHG | OXYGEN SATURATION: 95 % | WEIGHT: 203 LBS | SYSTOLIC BLOOD PRESSURE: 102 MMHG

## 2021-07-21 DIAGNOSIS — Z01.818 PRE-OP EXAMINATION: Primary | ICD-10-CM

## 2021-07-21 DIAGNOSIS — Z11.59 ENCOUNTER FOR SCREENING FOR OTHER VIRAL DISEASES: ICD-10-CM

## 2021-07-21 LAB — SARS-COV-2 RNA RESP QL NAA+PROBE: NEGATIVE

## 2021-07-21 PROCEDURE — 99203 OFFICE O/P NEW LOW 30 MIN: CPT | Performed by: PHYSICIAN ASSISTANT

## 2021-07-21 PROCEDURE — U0005 INFEC AGEN DETEC AMPLI PROBE: HCPCS | Performed by: OPHTHALMOLOGY

## 2021-07-21 RX ORDER — CARBOXYMETHYLCELLULOSE SODIUM 5 MG/ML
1 SOLUTION/ DROPS OPHTHALMIC 3 TIMES DAILY
COMMUNITY
End: 2023-08-16

## 2021-07-21 ASSESSMENT — ENCOUNTER SYMPTOMS: SEIZURES: 0

## 2021-07-21 ASSESSMENT — PAIN SCALES - GENERAL: PAINLEVEL: NO PAIN (0)

## 2021-07-21 ASSESSMENT — MIFFLIN-ST. JEOR: SCORE: 1448.43

## 2021-07-21 ASSESSMENT — LIFESTYLE VARIABLES: TOBACCO_USE: 1

## 2021-07-21 NOTE — H&P (VIEW-ONLY)
Pre-Operative H & P     CC:  Preoperative exam to assess for increased cardiopulmonary risk while undergoing surgery and anesthesia.    Date of Encounter: 7/21/2021  Primary Care Physician:  Julieta Hebert     Reason for visit: pre operative examination, Mature cataract    HPI  Jada Latham is a 75 year old female who presents for pre-operative H & P in preparation for RIGHT EYE CATARACT REMOVAL WITH INTRAOCULAR LENS IMPLANT with Dr. Early on 7/23/21 at [facility].     The patient is a 75 year old woman who has a past medical history significant for obesity, schizoaffective disorder, bipolar type I with manic episodes, possible parkinsonism secondary to medications who has been seen in our clinic in the past for the procedure as above in January 2020. The patient ultimately did not have her procedure for her cataracts. The patient was hospitalized for her schizoaffective and bipolar disorder in April 2021 and is under [facility] commitment currently. She is now rescheduled for the procedure as above.     History is obtained from the patient and chart review    Past Medical History  Past Medical History:   Diagnosis Date     Cataract      HLD (hyperlipidemia)      Obesity      Schizoaffective disorder, bipolar type (H)        Past Surgical History  Past Surgical History:   Procedure Laterality Date     TONSILLECTOMY      as a child       Hx of Blood transfusions/reactions: denies     Hx of abnormal bleeding or anti-platelet use: none    Menstrual history: No LMP recorded. Patient is postmenopausal.:     Steroid use in the last year: none    Personal or FH with difficulty with Anesthesia:  denies    Prior to Admission Medications  Current Outpatient Medications   Medication Sig Dispense Refill     carboxymethylcellulose PF (REFRESH PLUS) 0.5 % ophthalmic solution Place 1 drop into both eyes 3 times daily       diphenhydrAMINE (BENADRYL) 50 MG capsule Take 50 mg by mouth At Bedtime         "haloperidol decanoate (HALDOL DECANOATE) 50 MG/ML SOLN injection Inject 200 mg into the muscle every 30 days        senna (SENOKOT) 8.6 MG tablet Take 8.6 mg by mouth 2 times daily as needed        ofloxacin (OCUFLOX) 0.3 % ophthalmic solution 1 drop 4x daily in the surgical eye for 1 week after surgery, then stop 5 mL 0     prednisoLONE acetate (PRED FORTE) 1 % ophthalmic suspension 1 drop in surgical eye as directed, 4x daily after surgery for 1 week, 3x daily for 1 week, 2x daily for 1 week, daily for 1 week, then stop 5 mL 1       Allergies  Allergies   Allergen Reactions     No Clinical Screening - See Comments      Pt does not know the name of medications that caused allergic reactions --she states to \"refer to the court documents\"       Social History  Social History     Socioeconomic History     Marital status: Single     Spouse name: Not on file     Number of children: Not on file     Years of education: Not on file     Highest education level: Not on file   Occupational History     Not on file   Tobacco Use     Smoking status: Former Smoker     Packs/day: 1.00     Years: 25.00     Pack years: 25.00     Quit date:      Years since quittin.     Smokeless tobacco: Never Used     Tobacco comment: started at age 13 yearsn with 1 cigarette a week in high school and college   Substance and Sexual Activity     Alcohol use: No     Drug use: No     Sexual activity: Not on file   Other Topics Concern     Not on file   Social History Narrative     Not on file     Social Determinants of Health     Financial Resource Strain:      Difficulty of Paying Living Expenses:    Food Insecurity:      Worried About Running Out of Food in the Last Year:      Ran Out of Food in the Last Year:    Transportation Needs:      Lack of Transportation (Medical):      Lack of Transportation (Non-Medical):    Physical Activity:      Days of Exercise per Week:      Minutes of Exercise per Session:    Stress:      Feeling of Stress " :    Social Connections:      Frequency of Communication with Friends and Family:      Frequency of Social Gatherings with Friends and Family:      Attends [employer] Services:      Active Member of Clubs or Organizations:      Attends Club or Organization Meetings:      Marital Status:    Intimate Partner Violence:      Fear of Current or Ex-Partner:      Emotionally Abused:      Physically Abused:      Sexually Abused:        Family History  Family History   Problem Relation Age of Onset     Cancer No family hx of      Diabetes No family hx of      Glaucoma No family hx of      Hypertension No family hx of      Retinal detachment No family hx of      Macular Degeneration No family hx of      Bleeding Disorder No family hx of      Clotting Disorder No family hx of      Anesthesia Reaction No family hx of        Review of Systems    ROS/MED HX  ENT/Pulmonary: Comment: cataract    (+) tobacco use (the patient smoked for 25 years and started 1 cigarette per week but at the end was at 1 ppd. She quit in 1993), Past use,     Neurologic: Comment: RLS - patient reports this is resolved.     Possible parkinsonism 2/2 medications and followed by [facility] providers   (-) no seizures, no CVA and no TIA   Cardiovascular:     (+) Dyslipidemia -----Previous cardiac testing   Echo: Date: Results:    Stress Test: Date: Results:    ECG Reviewed: Date: 2012 Results:  Normal sinus rhythm   Possible Left atrial enlargement   Borderline ECG    Cath: Date: Results:      METS/Exercise Tolerance: 3 - Able to walk 1-2 blocks without stopping    Hematologic:  - neg hematologic  ROS     Musculoskeletal:  - neg musculoskeletal ROS     GI/Hepatic:  - neg GI/hepatic ROS  (-) GERD   Renal/Genitourinary:  - neg Renal ROS     Endo:  - neg endo ROS   (+) Obesity,     Psychiatric/Substance Use:     (+) psychiatric history schizophrenia and bipolar     Infectious Disease:  - neg infectious disease ROS     Malignancy:  - neg malignancy ROS     Other:  - neg  "other ROS          The complete review of systems is negative other than noted in the HPI or here.   Temp: 97.8  F (36.6  C) Temp src: Oral BP: 102/57 Pulse: 62   Resp: 16 SpO2: 95 %         203 lbs 0 oz  5' 7\"[PT REPORTED[   Body mass index is 31.79 kg/m .       Physical Exam  Constitutional: Awake, alert, cooperative, no apparent distress, and appears stated age.  Eyes: Pupils equal, round and reactive to light, extra ocular muscles intact, sclera clear, conjunctiva normal. Patient is blind  HENT: Normocephalic, oral pharynx with moist mucus membranes, good dentition. No goiter appreciated.   Respiratory: Clear to auscultation bilaterally, no crackles or wheezing.  Cardiovascular: Regular rate and rhythm, normal S1 and S2, and no murmur noted.  Carotids +2, no bruits. No edema. Palpable pulses to radial  DP and PT arteries.   GI: Normal bowel sounds, soft, non-distended, non-tender, obese  Lymph/Hematologic: No cervical lymphadenopathy and no supraclavicular lymphadenopathy.  Genitourinary:  defer  Skin: Warm and dry.  No rashes at anticipated surgical site.   Musculoskeletal: Limited ROM of neck. There is no redness, warmth, or swelling of the joints. Gross motor strength is normal.    Neurologic: Awake, alert, oriented to name, place and time. Cranial nerves II-XII are grossly intact. Gait is normal.   Neuropsychiatric: Calm, cooperative. Normal affect with some paranoid tendencies      Labs: (personally reviewed)  EKG 2012  Normal sinus rhythm   Possible Left atrial enlargement   Borderline ECG    CBC WITH PLTS/AUTO DIFF  Specimen:  Blood   Ref Range & Units 2 mo ago Comments   WBC 4.00 - 10.00 k/cmm 5.20          RBC 3.90 - 5.20 m/cmm 4.99          Hgb 11.5 - 15.7 g/dL 14.6          Hematocrit 34.0 - 45.0 % 45.9High           MCV 80.0 - 100.0 fL 92.0          MCH 25.0 - 32.0 pg 29.3          MCHC 31.0 - 36.0 g/dL 31.8          RDW 11.5 - 14.5 % 13.8          Plt 150 - 400 k/cmm 246          MPV 6.5 - 12.5 fL " 9.7          NRBC 0.0 - 0.0 % 0.0          Automated Abs Neutrophil 1.70 - 6.50 k/cmm 3.55  Preliminary ANC, final result to follow.        Abs Immature Granulocyte 0.00 - 0.09 k/cmm 0.03  The Immature Granulocyte Absolute count contains metamyelocytes and myelocytes.        Abs Neutrophil 1.70 - 6.50 k/cmm 3.55          Abs Lymphocyte 0.80 - 4.00 k/cmm 1.04          Abs Monocyte 0.20 - 1.00 k/cmm 0.48          Abs Eosinophil 0.00 - 0.60 k/cmm 0.05          Abs Basophil 0.00 - 0.20 k/cmm 0.05       PANEL BASIC METABOLIC (BMP)  Specimen:  Blood   Ref Range & Units 2 mo ago Comments   Glucose 70 - 100 mg/dL 96          Creatinine 0.50 - 1.00 mg/dL 0.64          Calcium 8.8 - 10.2 mg/dL 9.7          Sodium 135 - 148 mEq/L 136          Potassium 3.5 - 5.3 mEq/L 4.5          Chloride 92 - 108 mEq/L 103          AnGap 8 - 16 mEq/L 11          eGFR, High >=60 ml/min/1.73m2 101  Calculated using CKD-EPI equation        eGFR, Low >=60 ml/min/1.73m2 88  Calculated using CKD-EPI equation        CO2 22 - 30 mEq/L 22          BUN 8 - 23 mg/dL 14       The patient's records and results personally reviewed by this provider.     Outside records reviewed from: care everywhere    ASSESSMENT and PLAN  Jada is a 75 year old woman who is scheduled for RIGHT EYE CATARACT REMOVAL WITH INTRAOCULAR LENS IMPLANT on 7/23/21 by Dr. Early in treatment of Mature cataract.  PAC referral for risk assessment and optimization for anesthesia with comorbid conditions of obesity, former smoker, schizoaffective disorder, bipolar type I with manic episodes, possible parkinsonism secondary to medications:     Pre-operative considerations:  1.  Cardiac:  Functional status- METS 4. The patient reports she works with PT/OT at the [facility] for 30 minutes a day. She walks in place for 200 steps 15-20 times a day. She denies any cardiac symptoms. Low risk surgery with 0.4% (RCRI 0) risk of major adverse cardiac event.   -denies CP, SOB, CHAVEZ, palpitations,  lightheadedness, syncope  -denies cardiac history. The patient did have testing showing HLD but not on medications. She had an EKG in 2012 with sinus rhythm and possible left axis deviation.      2.  Pulm:  Airway feasible.  CHRISTOPHER risk:  Low, STOPBANG 2/8  -former smoker - the patient smoked for 25 years and started 1 cigarette per week but at the end was at 1 ppd. She quit in 1993  ~She denies URI symptoms.      3.  GI:  Risk of PONV score = 2.  If > 2, anti-emetic intervention recommended.    4. Neuro: The patient is being monitoring at her care facility for possible parkinsonism 2/2 to medications.   ~ The patient reports her medications used to give her RLS but since stopping medications this has resolved.     5. Endo: Obesity, BMI 36 - consideration for safe lifting techniques.     6. : The patient reports she had urinary incontinence secondary to medications and that since stopping medications this has resolved. She denies any UTI symptoms.     7. Psych: Schizoaffective disorder and bipolar with manic episodes - the patient was hospitalized in April 2021 and is currently under [facility] commitment. She reports today that she is not taking any medications and that the reason for her cataracts is that extra stuff they put in medications. She reports she has done extensive studies on this. Continue Haldol. Have messaged surgical team about patient's [facility] commitment and need for consent from POA if the patient has one.     ADDENDUM: ** I was able to speak with the patient's  and she confirmed that the patient is her own legal guardian so is able to consent for her procedure. **     VTE risk: 0.5%    The patient is optimized for their procedure. AVS with information on surgery time/arrival time, meds and NPO status given by nursing staff.      Patient was discussed with Dr Xie.    On the day of service:     Prep time: 1 minutes  Visit time: 15 minutes  Documentation time(messaing to legal team,  filling out [facility] paperwork, connecting with PharmD and staff anesthesiologist: 25 minutes  ------------------------------------------  Total time: 41 minutes      Jasmin Jang PA-C  Preoperative Assessment Center  [facility]  Phone: [phone]  Fax: [phone]

## 2021-07-21 NOTE — PATIENT INSTRUCTIONS
Preparing for Your Surgery      Name:  Jada Latham   MRN:  [id number]   :  1945   Today's Date:  2021       Arriving for surgery:  Surgery date:  21  Arrival time:  09:15 am    Restrictions due to COVID 19:       One visitor is allowed in the Pre Op area. When you go into surgery, one visitor is allowed to wait in the Surgery Waiting Room       (provided there is enough space to social distance).   After surgery- Two visitors are allowed at a time if you have a private room and one visitor is allowed for those in a semi-private room.   Every 4 days the visitor(s) can rotate. During the 4 day period, the visitor(s) must be consistent. No visitors under the age of 18 years old.   Visiting Hours: 8 am - 8:30 pm   No ill visitors.   All visitors must wear face mask.    [employer] parking is available for anyone with mobility limitations or disabilities.  ([place]  24 hours/ 7 days a week; [place]  7 am- 3:30 pm, Mon- Fri)    Please come to:   [facility] [address] [address]  -  Proceed to the 5th floor to check into the Ambulatory Surgery Center.              >> There will be patient concierges on the 1st and 5th floor, for assistance or an escort, if you would like.              >> Please call [phone] with any questions.    What can I eat or drink?  -  You may eat and drink normally for up to 8 hours before your surgery.   -  You may have clear liquids until 4 hours before surgery.    Examples of clear liquids:  Water  Clear broth  Juices (apple, white grape, white cranberry  and cider) without pulp  Noncarbonated, powder based beverages  (lemonade and Artur-Aid)  Sodas (Sprite, 7-Up, ginger ale and seltzer)  Coffee or tea (without milk or cream)  Gatorade    -  No Alcohol for at least 24 hours before surgery     Which medicines can I take?  Hold Aspirin for 7 days before surgery.   Hold Multivitamins for 7 days before  surgery.  Hold Supplements for 7 days before surgery.  Hold Ibuprofen (Advil, Motrin) for 1 day before surgery--unless otherwise directed by surgeon.  Hold Naproxen (Aleve) for 4 days before surgery.  -  PLEASE TAKE these medications the day of surgery:  Tylenol if needed; take morning medications.    How do I prepare myself?  - Please take 2 showers before surgery using Scrubcare or Hibiclens soap.    Use this soap only from the neck to your toes.     Leave the soap on your skin for one minute--then rinse thoroughly.      You may use your own shampoo and conditioner; no other hair products.   - Please remove all jewelry and body piercings.  - No lotions, deodorants or fragrance.  - No makeup or fingernail polish.   - Bring your ID and insurance card.    - All patients are required to have a Covid-19 test within 4 days of surgery/procedure.      -Patients will be contacted by the [facility] scheduling team within 1 week of surgery to make an appointment.      - Patients may call the Scheduling team at [phone] if they have not been scheduled within 4 days of  surgery.      ALL PATIENTS GOING HOME THE SAME DAY OF SURGERY ARE REQUIRED TO HAVE A RESPONSIBLE ADULT TO DRIVE AND BE IN ATTENDANCE WITH THEM FOR 24 HOURS FOLLOWING SURGERY.      Questions or Concerns:    - For any questions regarding the day of surgery or your hospital stay, please contact the Pre Admission Nursing Office at [phone].       - If you have health changes between today and your surgery please call your surgeon.       For questions after surgery please call your surgeons office.

## 2021-07-21 NOTE — ANESTHESIA PREPROCEDURE EVALUATION
"Anesthesia Pre-Procedure Evaluation    Patient: Jada Latham   MRN: [id number] : 1945        Preoperative Diagnosis: Mature cataract [H26.8]   Procedure : Procedure(s):  RIGHT EYE CATARACT REMOVAL WITH INTRAOCULAR LENS IMPLANT     Past Medical History:   Diagnosis Date     Cataract      HLD (hyperlipidemia)      Obesity      Schizoaffective disorder, bipolar type (H)       Past Surgical History:   Procedure Laterality Date     TONSILLECTOMY      as a child      Allergies   Allergen Reactions     No Clinical Screening - See Comments      Pt does not know the name of medications that caused allergic reactions --she states to \"refer to the court documents\"      Social History     Tobacco Use     Smoking status: Former Smoker     Smokeless tobacco: Never Used   Substance Use Topics     Alcohol use: No      Wt Readings from Last 1 Encounters:   20 99.6 kg (219 lb 8 oz)        Anesthesia Evaluation   Pt has had prior anesthetic. Type: General.    No history of anesthetic complications       ROS/MED HX  ENT/Pulmonary: Comment: cataract    (+) tobacco use (the patient smoked for 25 years and started 1 cigarette per week but at the end was at 1 ppd. She quit in ), Past use,     Neurologic: Comment: RLS - patient reports this is resolved.     Possible parkinsonism 2/2 medications and followed by [facility] providers   (-) no seizures, no CVA and no TIA   Cardiovascular:     (+) Dyslipidemia -----Previous cardiac testing   Echo: Date: Results:    Stress Test: Date: Results:    ECG Reviewed: Date:  Results:  Normal sinus rhythm   Possible Left atrial enlargement   Borderline ECG    Cath: Date: Results:      METS/Exercise Tolerance: 3 - Able to walk 1-2 blocks without stopping    Hematologic:  - neg hematologic  ROS     Musculoskeletal:  - neg musculoskeletal ROS     GI/Hepatic:  - neg GI/hepatic ROS  (-) GERD   Renal/Genitourinary:  - neg Renal ROS     Endo:  - neg endo ROS   (+) Obesity,   "   Psychiatric/Substance Use:     (+) psychiatric history schizophrenia and bipolar     Infectious Disease:  - neg infectious disease ROS     Malignancy:  - neg malignancy ROS     Other:  - neg other ROS          Physical Exam    Airway        Mallampati: II   TM distance: > 3 FB   Neck ROM: limited   Mouth opening: > 3 cm    Respiratory Devices and Support         Dental           Cardiovascular   cardiovascular exam normal          Pulmonary   pulmonary exam normal                OUTSIDE LABS:  CBC: No results found for: WBC, HGB, HCT, PLT  BMP: No results found for: NA, POTASSIUM, CHLORIDE, CO2, BUN, CR, GLC  COAGS: No results found for: PTT, INR, FIBR  POC: No results found for: BGM, HCG, HCGS  HEPATIC: No results found for: ALBUMIN, PROTTOTAL, ALT, AST, GGT, ALKPHOS, BILITOTAL, BILIDIRECT, PRADEEP  OTHER: No results found for: PH, LACT, A1C, GOMEZ, PHOS, MAG, LIPASE, AMYLASE, TSH, T4, T3, CRP, SED    Anesthesia Plan    ASA Status:  2   NPO Status:  NPO Appropriate    Anesthesia Type: MAC.     - Reason for MAC: straight local not clinically adequate   Induction: Intravenous.   Maintenance: TIVA.        Consents    Anesthesia Plan(s) and associated risks, benefits, and realistic alternatives discussed. Questions answered and patient/representative(s) expressed understanding.     - Discussed with:  Patient      - Extended Intubation/Ventilatory Support Discussed: No.      - Patient is DNR/DNI Status: No    Use of blood products discussed: No .     Postoperative Care    Pain management: Multi-modal analgesia.   PONV prophylaxis: Ondansetron (or other 5HT-3)     Comments:              PAC Discussion and Assessment    ASA Classification: 2  Case is suitable for: ASC  Anesthetic techniques and relevant risks discussed: MAC with GA as backup                  PAC Resident/NP Anesthesia Assessment: Jada is a 75 year old woman who is scheduled for RIGHT EYE CATARACT REMOVAL WITH INTRAOCULAR LENS IMPLANT on 7/23/21 by   Sharath in treatment of Mature cataract.  PAC referral for risk assessment and optimization for anesthesia with comorbid conditions of obesity, former smoker, schizoaffective disorder, bipolar type I with manic episodes, possible parkinsonism secondary to medications:     Pre-operative considerations:  1.  Cardiac:  Functional status- METS 4. The patient reports she works with PT/OT at the [facility] for 30 minutes a day. She walks in place for 200 steps 15-20 times a day. She denies any cardiac symptoms. Low risk surgery with 0.4% (RCRI 0) risk of major adverse cardiac event.   -denies CP, SOB, CHAVEZ, palpitations, lightheadedness, syncope  -denies cardiac history. The patient did have testing showing HLD but not on medications. She had an EKG in 2012 with sinus rhythm and possible left axis deviation.      2.  Pulm:  Airway feasible.  CHRISTOPHER risk:  Low, STOPBANG 2/8  -former smoker - the patient smoked for 25 years and started 1 cigarette per week but at the end was at 1 ppd. She quit in 1993  ~She denies URI symptoms.      3.  GI:  Risk of PONV score = 2.  If > 2, anti-emetic intervention recommended.    4. Neuro: The patient is being monitoring at her care facility for possible parkinsonism 2/2 to medications.   ~ The patient reports her medications used to give her RLS but since stopping medications this has resolved.     5. Endo: Obesity, BMI 36 - consideration for safe lifting techniques.     6. : The patient reports she had urinary incontinence secondary to medications and that since stopping medications this has resolved. She denies any UTI symptoms.     7. Psych: Schizoaffective disorder and bipolar with manic episodes - the patient was hospitalized in April 2021 and is currently under [facility] commitment. She reports today that she is not taking any medications and that the reason for her cataracts is that extra stuff they put in medications. She reports she has done extensive studies on this. Continue Haldol. Have  messaged surgical team about patient's [facility] commitment and need for consent from POA if the patient has one.     ** I was able to speak with the patient's  and she confirmed that the patient is her own legal guardian so is able to consent for her procedure. **     VTE risk: 0.5%    Patient is optimized and is acceptable candidate for the proposed procedure.  No further diagnostic evaluation is needed.      Patient discussed with Dr. Xie    For further details of assessment, testing, and physical exam please see H and P completed on same date     Jasmin Jang PA-C       Mid-Level Provider/Resident: Jasmin Jang PA-C  Date: 7/21/21      Attending Anesthesiologist Anesthesia Assessment: Surgeon will obtain  consent for procedure  from court appointed  power of , please include anesthesia sedation, or have their contact  phone number available.  Reviewed and Signed by PAC Anesthesiologist  Anesthesiologist: Rojas  Date: 7/21/21                     Jasmin Jang PA-C

## 2021-07-21 NOTE — H&P
Pre-Operative H & P     CC:  Preoperative exam to assess for increased cardiopulmonary risk while undergoing surgery and anesthesia.    Date of Encounter: 7/21/2021  Primary Care Physician:  Julieta Hebert     Reason for visit: pre operative examination, Mature cataract    HPI  Jada Latham is a 75 year old female who presents for pre-operative H & P in preparation for RIGHT EYE CATARACT REMOVAL WITH INTRAOCULAR LENS IMPLANT with Dr. Early on 7/23/21 at [facility].     The patient is a 75 year old woman who has a past medical history significant for obesity, schizoaffective disorder, bipolar type I with manic episodes, possible parkinsonism secondary to medications who has been seen in our clinic in the past for the procedure as above in January 2020. The patient ultimately did not have her procedure for her cataracts. The patient was hospitalized for her schizoaffective and bipolar disorder in April 2021 and is under [facility] commitment currently. She is now rescheduled for the procedure as above.     History is obtained from the patient and chart review    Past Medical History  Past Medical History:   Diagnosis Date     Cataract      HLD (hyperlipidemia)      Obesity      Schizoaffective disorder, bipolar type (H)        Past Surgical History  Past Surgical History:   Procedure Laterality Date     TONSILLECTOMY      as a child       Hx of Blood transfusions/reactions: denies     Hx of abnormal bleeding or anti-platelet use: none    Menstrual history: No LMP recorded. Patient is postmenopausal.:     Steroid use in the last year: none    Personal or FH with difficulty with Anesthesia:  denies    Prior to Admission Medications  Current Outpatient Medications   Medication Sig Dispense Refill     carboxymethylcellulose PF (REFRESH PLUS) 0.5 % ophthalmic solution Place 1 drop into both eyes 3 times daily       diphenhydrAMINE (BENADRYL) 50 MG capsule Take 50 mg by mouth At Bedtime         "haloperidol decanoate (HALDOL DECANOATE) 50 MG/ML SOLN injection Inject 200 mg into the muscle every 30 days        senna (SENOKOT) 8.6 MG tablet Take 8.6 mg by mouth 2 times daily as needed        ofloxacin (OCUFLOX) 0.3 % ophthalmic solution 1 drop 4x daily in the surgical eye for 1 week after surgery, then stop 5 mL 0     prednisoLONE acetate (PRED FORTE) 1 % ophthalmic suspension 1 drop in surgical eye as directed, 4x daily after surgery for 1 week, 3x daily for 1 week, 2x daily for 1 week, daily for 1 week, then stop 5 mL 1       Allergies  Allergies   Allergen Reactions     No Clinical Screening - See Comments      Pt does not know the name of medications that caused allergic reactions --she states to \"refer to the court documents\"       Social History  Social History     Socioeconomic History     Marital status: Single     Spouse name: Not on file     Number of children: Not on file     Years of education: Not on file     Highest education level: Not on file   Occupational History     Not on file   Tobacco Use     Smoking status: Former Smoker     Packs/day: 1.00     Years: 25.00     Pack years: 25.00     Quit date:      Years since quittin.     Smokeless tobacco: Never Used     Tobacco comment: started at age 13 yearsn with 1 cigarette a week in high school and college   Substance and Sexual Activity     Alcohol use: No     Drug use: No     Sexual activity: Not on file   Other Topics Concern     Not on file   Social History Narrative     Not on file     Social Determinants of Health     Financial Resource Strain:      Difficulty of Paying Living Expenses:    Food Insecurity:      Worried About Running Out of Food in the Last Year:      Ran Out of Food in the Last Year:    Transportation Needs:      Lack of Transportation (Medical):      Lack of Transportation (Non-Medical):    Physical Activity:      Days of Exercise per Week:      Minutes of Exercise per Session:    Stress:      Feeling of Stress " :    Social Connections:      Frequency of Communication with Friends and Family:      Frequency of Social Gatherings with Friends and Family:      Attends [employer] Services:      Active Member of Clubs or Organizations:      Attends Club or Organization Meetings:      Marital Status:    Intimate Partner Violence:      Fear of Current or Ex-Partner:      Emotionally Abused:      Physically Abused:      Sexually Abused:        Family History  Family History   Problem Relation Age of Onset     Cancer No family hx of      Diabetes No family hx of      Glaucoma No family hx of      Hypertension No family hx of      Retinal detachment No family hx of      Macular Degeneration No family hx of      Bleeding Disorder No family hx of      Clotting Disorder No family hx of      Anesthesia Reaction No family hx of        Review of Systems    ROS/MED HX  ENT/Pulmonary: Comment: cataract    (+) tobacco use (the patient smoked for 25 years and started 1 cigarette per week but at the end was at 1 ppd. She quit in 1993), Past use,     Neurologic: Comment: RLS - patient reports this is resolved.     Possible parkinsonism 2/2 medications and followed by [facility] providers   (-) no seizures, no CVA and no TIA   Cardiovascular:     (+) Dyslipidemia -----Previous cardiac testing   Echo: Date: Results:    Stress Test: Date: Results:    ECG Reviewed: Date: 2012 Results:  Normal sinus rhythm   Possible Left atrial enlargement   Borderline ECG    Cath: Date: Results:      METS/Exercise Tolerance: 3 - Able to walk 1-2 blocks without stopping    Hematologic:  - neg hematologic  ROS     Musculoskeletal:  - neg musculoskeletal ROS     GI/Hepatic:  - neg GI/hepatic ROS  (-) GERD   Renal/Genitourinary:  - neg Renal ROS     Endo:  - neg endo ROS   (+) Obesity,     Psychiatric/Substance Use:     (+) psychiatric history schizophrenia and bipolar     Infectious Disease:  - neg infectious disease ROS     Malignancy:  - neg malignancy ROS     Other:  - neg  "other ROS          The complete review of systems is negative other than noted in the HPI or here.   Temp: 97.8  F (36.6  C) Temp src: Oral BP: 102/57 Pulse: 62   Resp: 16 SpO2: 95 %         203 lbs 0 oz  5' 7\"[PT REPORTED[   Body mass index is 31.79 kg/m .       Physical Exam  Constitutional: Awake, alert, cooperative, no apparent distress, and appears stated age.  Eyes: Pupils equal, round and reactive to light, extra ocular muscles intact, sclera clear, conjunctiva normal. Patient is blind  HENT: Normocephalic, oral pharynx with moist mucus membranes, good dentition. No goiter appreciated.   Respiratory: Clear to auscultation bilaterally, no crackles or wheezing.  Cardiovascular: Regular rate and rhythm, normal S1 and S2, and no murmur noted.  Carotids +2, no bruits. No edema. Palpable pulses to radial  DP and PT arteries.   GI: Normal bowel sounds, soft, non-distended, non-tender, obese  Lymph/Hematologic: No cervical lymphadenopathy and no supraclavicular lymphadenopathy.  Genitourinary:  defer  Skin: Warm and dry.  No rashes at anticipated surgical site.   Musculoskeletal: Limited ROM of neck. There is no redness, warmth, or swelling of the joints. Gross motor strength is normal.    Neurologic: Awake, alert, oriented to name, place and time. Cranial nerves II-XII are grossly intact. Gait is normal.   Neuropsychiatric: Calm, cooperative. Normal affect with some paranoid tendencies      Labs: (personally reviewed)  EKG 2012  Normal sinus rhythm   Possible Left atrial enlargement   Borderline ECG    CBC WITH PLTS/AUTO DIFF  Specimen:  Blood   Ref Range & Units 2 mo ago Comments   WBC 4.00 - 10.00 k/cmm 5.20          RBC 3.90 - 5.20 m/cmm 4.99          Hgb 11.5 - 15.7 g/dL 14.6          Hematocrit 34.0 - 45.0 % 45.9High           MCV 80.0 - 100.0 fL 92.0          MCH 25.0 - 32.0 pg 29.3          MCHC 31.0 - 36.0 g/dL 31.8          RDW 11.5 - 14.5 % 13.8          Plt 150 - 400 k/cmm 246          MPV 6.5 - 12.5 fL " 9.7          NRBC 0.0 - 0.0 % 0.0          Automated Abs Neutrophil 1.70 - 6.50 k/cmm 3.55  Preliminary ANC, final result to follow.        Abs Immature Granulocyte 0.00 - 0.09 k/cmm 0.03  The Immature Granulocyte Absolute count contains metamyelocytes and myelocytes.        Abs Neutrophil 1.70 - 6.50 k/cmm 3.55          Abs Lymphocyte 0.80 - 4.00 k/cmm 1.04          Abs Monocyte 0.20 - 1.00 k/cmm 0.48          Abs Eosinophil 0.00 - 0.60 k/cmm 0.05          Abs Basophil 0.00 - 0.20 k/cmm 0.05       PANEL BASIC METABOLIC (BMP)  Specimen:  Blood   Ref Range & Units 2 mo ago Comments   Glucose 70 - 100 mg/dL 96          Creatinine 0.50 - 1.00 mg/dL 0.64          Calcium 8.8 - 10.2 mg/dL 9.7          Sodium 135 - 148 mEq/L 136          Potassium 3.5 - 5.3 mEq/L 4.5          Chloride 92 - 108 mEq/L 103          AnGap 8 - 16 mEq/L 11          eGFR, High >=60 ml/min/1.73m2 101  Calculated using CKD-EPI equation        eGFR, Low >=60 ml/min/1.73m2 88  Calculated using CKD-EPI equation        CO2 22 - 30 mEq/L 22          BUN 8 - 23 mg/dL 14       The patient's records and results personally reviewed by this provider.     Outside records reviewed from: care everywhere    ASSESSMENT and PLAN  Jada is a 75 year old woman who is scheduled for RIGHT EYE CATARACT REMOVAL WITH INTRAOCULAR LENS IMPLANT on 7/23/21 by Dr. Early in treatment of Mature cataract.  PAC referral for risk assessment and optimization for anesthesia with comorbid conditions of obesity, former smoker, schizoaffective disorder, bipolar type I with manic episodes, possible parkinsonism secondary to medications:     Pre-operative considerations:  1.  Cardiac:  Functional status- METS 4. The patient reports she works with PT/OT at the [facility] for 30 minutes a day. She walks in place for 200 steps 15-20 times a day. She denies any cardiac symptoms. Low risk surgery with 0.4% (RCRI 0) risk of major adverse cardiac event.   -denies CP, SOB, CHAVEZ, palpitations,  lightheadedness, syncope  -denies cardiac history. The patient did have testing showing HLD but not on medications. She had an EKG in 2012 with sinus rhythm and possible left axis deviation.      2.  Pulm:  Airway feasible.  CHRISTOPHER risk:  Low, STOPBANG 2/8  -former smoker - the patient smoked for 25 years and started 1 cigarette per week but at the end was at 1 ppd. She quit in 1993  ~She denies URI symptoms.      3.  GI:  Risk of PONV score = 2.  If > 2, anti-emetic intervention recommended.    4. Neuro: The patient is being monitoring at her care facility for possible parkinsonism 2/2 to medications.   ~ The patient reports her medications used to give her RLS but since stopping medications this has resolved.     5. Endo: Obesity, BMI 36 - consideration for safe lifting techniques.     6. : The patient reports she had urinary incontinence secondary to medications and that since stopping medications this has resolved. She denies any UTI symptoms.     7. Psych: Schizoaffective disorder and bipolar with manic episodes - the patient was hospitalized in April 2021 and is currently under [facility] commitment. She reports today that she is not taking any medications and that the reason for her cataracts is that extra stuff they put in medications. She reports she has done extensive studies on this. Continue Haldol. Have messaged surgical team about patient's [facility] commitment and need for consent from POA if the patient has one.     ADDENDUM: ** I was able to speak with the patient's  and she confirmed that the patient is her own legal guardian so is able to consent for her procedure. **     VTE risk: 0.5%    The patient is optimized for their procedure. AVS with information on surgery time/arrival time, meds and NPO status given by nursing staff.      Patient was discussed with Dr Xie.    On the day of service:     Prep time: 1 minutes  Visit time: 15 minutes  Documentation time(messaing to legal team,  filling out [facility] paperwork, connecting with PharmD and staff anesthesiologist: 25 minutes  ------------------------------------------  Total time: 41 minutes      Jasmin Jang PA-C  Preoperative Assessment Center  [facility]  Phone: [phone]  Fax: [phone]

## 2021-07-21 NOTE — PROGRESS NOTES
Preoperative Assessment Center Medication History Note    Medication history completed on July 21, 2021 by this writer. See Epic admission navigator for prior to admission medications. Operating room staff will still need to confirm medications and last dose information on day of surgery.     Medication history interview sources  Patient interview: Yes- with pac RON  Care Everywhere records: Yes, [facility]  Surescripts pharmacy refill records: No  Other (if applicable):     Changes made to PTA medication list (reason)  Added: carboxymethylcellulose  Deleted: melatonin, ibuprofen, vit d, artifical tears ointment  Changed: updated dosing on haldol decanoate.     Additional medication history information (including reliability of information, actions taken by pharmacist):    **Note that patient declines being on any medications. However, she is currently under [facility] commitment and med list was updated from the facility where she is currently committed - all are listed as active medications on her list. Note she has not started the eye drops yet (pred-forte and ofloxacin as these are for postop)    Prior to Admission medications    Medication Sig Last Dose Taking? Auth Provider   carboxymethylcellulose PF (REFRESH PLUS) 0.5 % ophthalmic solution Place 1 drop into both eyes 3 times daily Taking Yes Unknown, Entered By History   diphenhydrAMINE (BENADRYL) 50 MG capsule Take 50 mg by mouth At Bedtime  Taking Yes Reported, Patient   haloperidol decanoate (HALDOL DECANOATE) 50 MG/ML SOLN injection Inject 200 mg into the muscle every 30 days  Taking Yes Reported, Patient   senna (SENOKOT) 8.6 MG tablet Take 8.6 mg by mouth 2 times daily as needed  Taking Yes Reported, Patient   ofloxacin (OCUFLOX) 0.3 % ophthalmic solution 1 drop 4x daily in the surgical eye for 1 week after surgery, then stop   Aletha Early MD   prednisoLONE acetate (PRED FORTE) 1 % ophthalmic suspension 1 drop in surgical eye as directed, 4x daily after  surgery for 1 week, 3x daily for 1 week, 2x daily for 1 week, daily for 1 week, then stop   Aletha Early MD        Medication history completed by: Dav Guerra [facility]

## 2021-07-21 NOTE — H&P (VIEW-ONLY)
Pre-Operative H & P     CC:  Preoperative exam to assess for increased cardiopulmonary risk while undergoing surgery and anesthesia.    Date of Encounter: 7/21/2021  Primary Care Physician:  Julieta Hebert     Reason for visit: pre operative examination, Mature cataract    HPI  Jada Latham is a 75 year old female who presents for pre-operative H & P in preparation for RIGHT EYE CATARACT REMOVAL WITH INTRAOCULAR LENS IMPLANT with Dr. Early on 7/23/21 at [facility].     The patient is a 75 year old woman who has a past medical history significant for obesity, schizoaffective disorder, bipolar type I with manic episodes, possible parkinsonism secondary to medications who has been seen in our clinic in the past for the procedure as above in January 2020. The patient ultimately did not have her procedure for her cataracts. The patient was hospitalized for her schizoaffective and bipolar disorder in April 2021 and is under [facility] commitment currently. She is now rescheduled for the procedure as above.     History is obtained from the patient and chart review    Past Medical History  Past Medical History:   Diagnosis Date     Cataract      HLD (hyperlipidemia)      Obesity      Schizoaffective disorder, bipolar type (H)        Past Surgical History  Past Surgical History:   Procedure Laterality Date     TONSILLECTOMY      as a child       Hx of Blood transfusions/reactions: denies     Hx of abnormal bleeding or anti-platelet use: none    Menstrual history: No LMP recorded. Patient is postmenopausal.:     Steroid use in the last year: none    Personal or FH with difficulty with Anesthesia:  denies    Prior to Admission Medications  Current Outpatient Medications   Medication Sig Dispense Refill     carboxymethylcellulose PF (REFRESH PLUS) 0.5 % ophthalmic solution Place 1 drop into both eyes 3 times daily       diphenhydrAMINE (BENADRYL) 50 MG capsule Take 50 mg by mouth At Bedtime         "haloperidol decanoate (HALDOL DECANOATE) 50 MG/ML SOLN injection Inject 200 mg into the muscle every 30 days        senna (SENOKOT) 8.6 MG tablet Take 8.6 mg by mouth 2 times daily as needed        ofloxacin (OCUFLOX) 0.3 % ophthalmic solution 1 drop 4x daily in the surgical eye for 1 week after surgery, then stop 5 mL 0     prednisoLONE acetate (PRED FORTE) 1 % ophthalmic suspension 1 drop in surgical eye as directed, 4x daily after surgery for 1 week, 3x daily for 1 week, 2x daily for 1 week, daily for 1 week, then stop 5 mL 1       Allergies  Allergies   Allergen Reactions     No Clinical Screening - See Comments      Pt does not know the name of medications that caused allergic reactions --she states to \"refer to the court documents\"       Social History  Social History     Socioeconomic History     Marital status: Single     Spouse name: Not on file     Number of children: Not on file     Years of education: Not on file     Highest education level: Not on file   Occupational History     Not on file   Tobacco Use     Smoking status: Former Smoker     Packs/day: 1.00     Years: 25.00     Pack years: 25.00     Quit date:      Years since quittin.     Smokeless tobacco: Never Used     Tobacco comment: started at age 13 yearsn with 1 cigarette a week in high school and college   Substance and Sexual Activity     Alcohol use: No     Drug use: No     Sexual activity: Not on file   Other Topics Concern     Not on file   Social History Narrative     Not on file     Social Determinants of Health     Financial Resource Strain:      Difficulty of Paying Living Expenses:    Food Insecurity:      Worried About Running Out of Food in the Last Year:      Ran Out of Food in the Last Year:    Transportation Needs:      Lack of Transportation (Medical):      Lack of Transportation (Non-Medical):    Physical Activity:      Days of Exercise per Week:      Minutes of Exercise per Session:    Stress:      Feeling of Stress " :    Social Connections:      Frequency of Communication with Friends and Family:      Frequency of Social Gatherings with Friends and Family:      Attends Faith Services:      Active Member of Clubs or Organizations:      Attends Club or Organization Meetings:      Marital Status:    Intimate Partner Violence:      Fear of Current or Ex-Partner:      Emotionally Abused:      Physically Abused:      Sexually Abused:        Family History  Family History   Problem Relation Age of Onset     Cancer No family hx of      Diabetes No family hx of      Glaucoma No family hx of      Hypertension No family hx of      Retinal detachment No family hx of      Macular Degeneration No family hx of      Bleeding Disorder No family hx of      Clotting Disorder No family hx of      Anesthesia Reaction No family hx of        Review of Systems    ROS/MED HX  ENT/Pulmonary: Comment: cataract    (+) tobacco use (the patient smoked for 25 years and started 1 cigarette per week but at the end was at 1 ppd. She quit in 1993), Past use,     Neurologic: Comment: RLS - patient reports this is resolved.     Possible parkinsonism 2/2 medications and followed by [facility] providers   (-) no seizures, no CVA and no TIA   Cardiovascular:     (+) Dyslipidemia -----Previous cardiac testing   Echo: Date: Results:    Stress Test: Date: Results:    ECG Reviewed: Date: 2012 Results:  Normal sinus rhythm   Possible Left atrial enlargement   Borderline ECG    Cath: Date: Results:      METS/Exercise Tolerance: 3 - Able to walk 1-2 blocks without stopping    Hematologic:  - neg hematologic  ROS     Musculoskeletal:  - neg musculoskeletal ROS     GI/Hepatic:  - neg GI/hepatic ROS  (-) GERD   Renal/Genitourinary:  - neg Renal ROS     Endo:  - neg endo ROS   (+) Obesity,     Psychiatric/Substance Use:     (+) psychiatric history schizophrenia and bipolar     Infectious Disease:  - neg infectious disease ROS     Malignancy:  - neg malignancy ROS     Other:  - neg  "other ROS          The complete review of systems is negative other than noted in the HPI or here.   Temp: 97.8  F (36.6  C) Temp src: Oral BP: 102/57 Pulse: 62   Resp: 16 SpO2: 95 %         203 lbs 0 oz  5' 7\"[PT REPORTED[   Body mass index is 31.79 kg/m .       Physical Exam  Constitutional: Awake, alert, cooperative, no apparent distress, and appears stated age.  Eyes: Pupils equal, round and reactive to light, extra ocular muscles intact, sclera clear, conjunctiva normal. Patient is blind  HENT: Normocephalic, oral pharynx with moist mucus membranes, good dentition. No goiter appreciated.   Respiratory: Clear to auscultation bilaterally, no crackles or wheezing.  Cardiovascular: Regular rate and rhythm, normal S1 and S2, and no murmur noted.  Carotids +2, no bruits. No edema. Palpable pulses to radial  DP and PT arteries.   GI: Normal bowel sounds, soft, non-distended, non-tender, obese  Lymph/Hematologic: No cervical lymphadenopathy and no supraclavicular lymphadenopathy.  Genitourinary:  defer  Skin: Warm and dry.  No rashes at anticipated surgical site.   Musculoskeletal: Limited ROM of neck. There is no redness, warmth, or swelling of the joints. Gross motor strength is normal.    Neurologic: Awake, alert, oriented to name, place and time. Cranial nerves II-XII are grossly intact. Gait is normal.   Neuropsychiatric: Calm, cooperative. Normal affect with some paranoid tendencies      Labs: (personally reviewed)  EKG 2012  Normal sinus rhythm   Possible Left atrial enlargement   Borderline ECG    CBC WITH PLTS/AUTO DIFF  Specimen:  Blood   Ref Range & Units 2 mo ago Comments   WBC 4.00 - 10.00 k/cmm 5.20          RBC 3.90 - 5.20 m/cmm 4.99          Hgb 11.5 - 15.7 g/dL 14.6          Hematocrit 34.0 - 45.0 % 45.9High           MCV 80.0 - 100.0 fL 92.0          MCH 25.0 - 32.0 pg 29.3          MCHC 31.0 - 36.0 g/dL 31.8          RDW 11.5 - 14.5 % 13.8          Plt 150 - 400 k/cmm 246          MPV 6.5 - 12.5 fL " 9.7          NRBC 0.0 - 0.0 % 0.0          Automated Abs Neutrophil 1.70 - 6.50 k/cmm 3.55  Preliminary ANC, final result to follow.        Abs Immature Granulocyte 0.00 - 0.09 k/cmm 0.03  The Immature Granulocyte Absolute count contains metamyelocytes and myelocytes.        Abs Neutrophil 1.70 - 6.50 k/cmm 3.55          Abs Lymphocyte 0.80 - 4.00 k/cmm 1.04          Abs Monocyte 0.20 - 1.00 k/cmm 0.48          Abs Eosinophil 0.00 - 0.60 k/cmm 0.05          Abs Basophil 0.00 - 0.20 k/cmm 0.05       PANEL BASIC METABOLIC (BMP)  Specimen:  Blood   Ref Range & Units 2 mo ago Comments   Glucose 70 - 100 mg/dL 96          Creatinine 0.50 - 1.00 mg/dL 0.64          Calcium 8.8 - 10.2 mg/dL 9.7          Sodium 135 - 148 mEq/L 136          Potassium 3.5 - 5.3 mEq/L 4.5          Chloride 92 - 108 mEq/L 103          AnGap 8 - 16 mEq/L 11          eGFR, High >=60 ml/min/1.73m2 101  Calculated using CKD-EPI equation        eGFR, Low >=60 ml/min/1.73m2 88  Calculated using CKD-EPI equation        CO2 22 - 30 mEq/L 22          BUN 8 - 23 mg/dL 14       The patient's records and results personally reviewed by this provider.     Outside records reviewed from: care everywhere    ASSESSMENT and PLAN  Jada is a 75 year old woman who is scheduled for RIGHT EYE CATARACT REMOVAL WITH INTRAOCULAR LENS IMPLANT on 7/23/21 by Dr. Early in treatment of Mature cataract.  PAC referral for risk assessment and optimization for anesthesia with comorbid conditions of obesity, former smoker, schizoaffective disorder, bipolar type I with manic episodes, possible parkinsonism secondary to medications:     Pre-operative considerations:  1.  Cardiac:  Functional status- METS 4. The patient reports she works with PT/OT at the [facility] for 30 minutes a day. She walks in place for 200 steps 15-20 times a day. She denies any cardiac symptoms. Low risk surgery with 0.4% (RCRI 0) risk of major adverse cardiac event.   -denies CP, SOB, CHAVEZ, palpitations,  lightheadedness, syncope  -denies cardiac history. The patient did have testing showing HLD but not on medications. She had an EKG in 2012 with sinus rhythm and possible left axis deviation.      2.  Pulm:  Airway feasible.  CHRISTOPHER risk:  Low, STOPBANG 2/8  -former smoker - the patient smoked for 25 years and started 1 cigarette per week but at the end was at 1 ppd. She quit in 1993  ~She denies URI symptoms.      3.  GI:  Risk of PONV score = 2.  If > 2, anti-emetic intervention recommended.    4. Neuro: The patient is being monitoring at her care facility for possible parkinsonism 2/2 to medications.   ~ The patient reports her medications used to give her RLS but since stopping medications this has resolved.     5. Endo: Obesity, BMI 36 - consideration for safe lifting techniques.     6. : The patient reports she had urinary incontinence secondary to medications and that since stopping medications this has resolved. She denies any UTI symptoms.     7. Psych: Schizoaffective disorder and bipolar with manic episodes - the patient was hospitalized in April 2021 and is currently under [facility] commitment. She reports today that she is not taking any medications and that the reason for her cataracts is that extra stuff they put in medications. She reports she has done extensive studies on this. Continue Haldol. Have messaged surgical team about patient's [facility] commitment and need for consent from POA if the patient has one.     ADDENDUM: ** I was able to speak with the patient's  and she confirmed that the patient is her own legal guardian so is able to consent for her procedure. **     VTE risk: 0.5%    The patient is optimized for their procedure. AVS with information on surgery time/arrival time, meds and NPO status given by nursing staff.      Patient was discussed with Dr Xie.    On the day of service:     Prep time: 1 minutes  Visit time: 15 minutes  Documentation time(messaing to legal team,  filling out [facility] paperwork, connecting with PharmD and staff anesthesiologist: 25 minutes  ------------------------------------------  Total time: 41 minutes      Jasmin Jang PA-C  Preoperative Assessment Center  [facility] and [facility]  Phone: [phone]  Fax: [phone]

## 2021-07-23 ENCOUNTER — ANESTHESIA (OUTPATIENT)
Dept: SURGERY | Facility: AMBULATORY SURGERY CENTER | Age: 76
End: 2021-07-23
Payer: COMMERCIAL

## 2021-07-23 ENCOUNTER — OFFICE VISIT (OUTPATIENT)
Dept: OPHTHALMOLOGY | Facility: CLINIC | Age: 76
End: 2021-07-23
Payer: MEDICARE

## 2021-07-23 ENCOUNTER — HOSPITAL ENCOUNTER (OUTPATIENT)
Facility: AMBULATORY SURGERY CENTER | Age: 76
End: 2021-07-23
Attending: OPHTHALMOLOGY
Payer: COMMERCIAL

## 2021-07-23 VITALS
DIASTOLIC BLOOD PRESSURE: 58 MMHG | RESPIRATION RATE: 18 BRPM | OXYGEN SATURATION: 99 % | TEMPERATURE: 97.6 F | HEIGHT: 67 IN | SYSTOLIC BLOOD PRESSURE: 124 MMHG | HEART RATE: 62 BPM | WEIGHT: 199.2 LBS | BODY MASS INDEX: 31.27 KG/M2

## 2021-07-23 DIAGNOSIS — H26.8 MATURE CATARACT: Primary | ICD-10-CM

## 2021-07-23 DIAGNOSIS — H25.811 COMBINED FORM OF AGE-RELATED CATARACT, RIGHT EYE: ICD-10-CM

## 2021-07-23 DIAGNOSIS — Z96.1 PSEUDOPHAKIA, RIGHT EYE: Primary | ICD-10-CM

## 2021-07-23 PROCEDURE — 99024 POSTOP FOLLOW-UP VISIT: CPT | Performed by: OPHTHALMOLOGY

## 2021-07-23 PROCEDURE — 66984 XCAPSL CTRC RMVL W/O ECP: CPT | Mod: RT

## 2021-07-23 DEVICE — EYE IMP IOL AMO PCL TECNIS ZCB00 25.5: Type: IMPLANTABLE DEVICE | Site: EYE | Status: FUNCTIONAL

## 2021-07-23 RX ORDER — TETRACAINE HYDROCHLORIDE 5 MG/ML
SOLUTION OPHTHALMIC PRN
Status: DISCONTINUED | OUTPATIENT
Start: 2021-07-23 | End: 2021-07-23 | Stop reason: HOSPADM

## 2021-07-23 RX ORDER — FENTANYL CITRATE 50 UG/ML
INJECTION, SOLUTION INTRAMUSCULAR; INTRAVENOUS PRN
Status: DISCONTINUED | OUTPATIENT
Start: 2021-07-23 | End: 2021-07-23

## 2021-07-23 RX ORDER — BALANCED SALT SOLUTION 6.4; .75; .48; .3; 3.9; 1.7 MG/ML; MG/ML; MG/ML; MG/ML; MG/ML; MG/ML
SOLUTION OPHTHALMIC PRN
Status: DISCONTINUED | OUTPATIENT
Start: 2021-07-23 | End: 2021-07-23 | Stop reason: HOSPADM

## 2021-07-23 RX ORDER — OFLOXACIN 3 MG/ML
1 SOLUTION/ DROPS OPHTHALMIC
Status: COMPLETED | OUTPATIENT
Start: 2021-07-23 | End: 2021-07-23

## 2021-07-23 RX ORDER — LIDOCAINE HYDROCHLORIDE 20 MG/ML
INJECTION, SOLUTION INFILTRATION; PERINEURAL PRN
Status: DISCONTINUED | OUTPATIENT
Start: 2021-07-23 | End: 2021-07-23

## 2021-07-23 RX ORDER — PREDNISOLONE ACETATE 10 MG/ML
SUSPENSION/ DROPS OPHTHALMIC
Qty: 5 ML | Refills: 1 | Status: SHIPPED | OUTPATIENT
Start: 2021-07-23 | End: 2021-08-06

## 2021-07-23 RX ORDER — CYCLOPENTOLAT/TROPIC/PHENYLEPH 1%-1%-2.5%
1 DROPS (EA) OPHTHALMIC (EYE)
Status: COMPLETED | OUTPATIENT
Start: 2021-07-23 | End: 2021-07-23

## 2021-07-23 RX ORDER — PROPOFOL 10 MG/ML
INJECTION, EMULSION INTRAVENOUS PRN
Status: DISCONTINUED | OUTPATIENT
Start: 2021-07-23 | End: 2021-07-23

## 2021-07-23 RX ORDER — MOXIFLOXACIN IN NACL,ISO-OS/PF 0.3MG/0.3
SYRINGE (ML) INTRAOCULAR PRN
Status: DISCONTINUED | OUTPATIENT
Start: 2021-07-23 | End: 2021-07-23 | Stop reason: HOSPADM

## 2021-07-23 RX ORDER — SODIUM CHLORIDE, SODIUM LACTATE, POTASSIUM CHLORIDE, CALCIUM CHLORIDE 600; 310; 30; 20 MG/100ML; MG/100ML; MG/100ML; MG/100ML
500 INJECTION, SOLUTION INTRAVENOUS CONTINUOUS
Status: DISCONTINUED | OUTPATIENT
Start: 2021-07-23 | End: 2021-07-23 | Stop reason: HOSPADM

## 2021-07-23 RX ORDER — OFLOXACIN 3 MG/ML
1 SOLUTION/ DROPS OPHTHALMIC 4 TIMES DAILY
Qty: 5 ML | Refills: 0 | Status: SHIPPED | OUTPATIENT
Start: 2021-07-23 | End: 2021-07-30

## 2021-07-23 RX ORDER — ONDANSETRON 2 MG/ML
INJECTION INTRAMUSCULAR; INTRAVENOUS PRN
Status: DISCONTINUED | OUTPATIENT
Start: 2021-07-23 | End: 2021-07-23

## 2021-07-23 RX ORDER — DICLOFENAC SODIUM 1 MG/ML
1 SOLUTION/ DROPS OPHTHALMIC
Status: COMPLETED | OUTPATIENT
Start: 2021-07-23 | End: 2021-07-23

## 2021-07-23 RX ORDER — LIDOCAINE 40 MG/G
CREAM TOPICAL
Status: DISCONTINUED | OUTPATIENT
Start: 2021-07-23 | End: 2021-07-23 | Stop reason: HOSPADM

## 2021-07-23 RX ADMIN — PROPOFOL 40 MG: 10 INJECTION, EMULSION INTRAVENOUS at 11:16

## 2021-07-23 RX ADMIN — OFLOXACIN 1 DROP: 3 SOLUTION/ DROPS OPHTHALMIC at 09:56

## 2021-07-23 RX ADMIN — SODIUM CHLORIDE, SODIUM LACTATE, POTASSIUM CHLORIDE, CALCIUM CHLORIDE 500 ML: 600; 310; 30; 20 INJECTION, SOLUTION INTRAVENOUS at 09:41

## 2021-07-23 RX ADMIN — Medication 1 DROP: at 10:00

## 2021-07-23 RX ADMIN — DICLOFENAC SODIUM 1 DROP: 1 SOLUTION/ DROPS OPHTHALMIC at 09:56

## 2021-07-23 RX ADMIN — FENTANYL CITRATE 25 MCG: 50 INJECTION, SOLUTION INTRAMUSCULAR; INTRAVENOUS at 11:12

## 2021-07-23 RX ADMIN — Medication 1 DROP: at 09:46

## 2021-07-23 RX ADMIN — ONDANSETRON 4 MG: 2 INJECTION INTRAMUSCULAR; INTRAVENOUS at 11:35

## 2021-07-23 RX ADMIN — Medication 1 DROP: at 09:55

## 2021-07-23 RX ADMIN — LIDOCAINE HYDROCHLORIDE 60 MG: 20 INJECTION, SOLUTION INFILTRATION; PERINEURAL at 11:16

## 2021-07-23 RX ADMIN — OFLOXACIN 1 DROP: 3 SOLUTION/ DROPS OPHTHALMIC at 09:47

## 2021-07-23 RX ADMIN — OFLOXACIN 1 DROP: 3 SOLUTION/ DROPS OPHTHALMIC at 10:00

## 2021-07-23 RX ADMIN — DICLOFENAC SODIUM 1 DROP: 1 SOLUTION/ DROPS OPHTHALMIC at 09:47

## 2021-07-23 RX ADMIN — DICLOFENAC SODIUM 1 DROP: 1 SOLUTION/ DROPS OPHTHALMIC at 10:00

## 2021-07-23 ASSESSMENT — VISUAL ACUITY
METHOD: SNELLEN - LINEAR
OD_SC: 20/200

## 2021-07-23 ASSESSMENT — MIFFLIN-ST. JEOR: SCORE: 1431.2

## 2021-07-23 ASSESSMENT — TONOMETRY
OD_IOP_MMHG: 16
IOP_METHOD: TONOPEN

## 2021-07-23 ASSESSMENT — EXTERNAL EXAM - RIGHT EYE: OD_EXAM: NORMAL

## 2021-07-23 ASSESSMENT — SLIT LAMP EXAM - LIDS: COMMENTS: NORMAL

## 2021-07-23 NOTE — PROGRESS NOTES
POD#0, status post cataract surgery, right eye, retrobulbar    No complaints.  Denies eye pain.    Impression/Plan:  Pseudophakia, OD: POD0, good post-operative appearance. IOP reasonable.    - Fluoroquinolone antibiotic 4x daily in the surgical eye for 1 week  - Prednisolone 4x daily in the surgical eye for 1 week, then weekly taper      Eye protection at all times and eye shield at night for 1 week.    Limited activities with no exercise or heavy lifting for 1 week.    Instructed patient to contact us for decreasing vision, eye pain, new floaters or flashes of light or other concerning symptoms.    Written instructions given    Return to clinic for 2nd eye already scheduled.    Teaching statement:  Complete documentation of historical and exam elements from today's encounter can be found in the full encounter summary report (not reduplicated in this progress note). I personally obtained the chief complaint(s) and history of present illness.  I confirmed and edited as necessary the review of systems, past medical/surgical history, family history, social history, and examination findings as documented by others; and I examined the patient myself. I personally reviewed the relevant tests, images, and reports as documented above.     I formulated and edited as necessary the assessment and plan and discussed the findings and management plan with the patient and family.,    Aletha Early MD  Comprehensive Ophthalmology & Ocular Pathology  Department of Ophthalmology and Visual Neurosciences  [email or web address]  Pager [phone]

## 2021-07-23 NOTE — DISCHARGE INSTRUCTIONS
[facility]  Home Care Following Anesthesia  For 24 hours after surgery:  1. Get plenty of rest.  A responsible adult must stay with you for at least 24 hours after you leave the surgery center.  2. Do not drive or use heavy equipment.  If you have weakness or tingling, don't drive or use heavy equipment until this feeling goes away.   3. Do not drink alcohol.   4. Avoid strenuous or risky activities.  Ask for help when climbing stairs.  5. You may feel lightheaded.  IF so, sit for a few minutes before standing.  Have someone help you get up.   6. If you have nausea (feel sick to your stomach): Drink only clear liquids such as apple juice, ginger ale, broth or 7-Up.  Rest may also help.  Be sure to drink enough fluids.  Move to a regular diet as you feel able.   7. You may have a slight fever.  Call the doctor if your fever is over 100 F (37.7 C) (taken under the tongue) or lasts longer than 24 hours.  8. You may have a dry mouth, a sore throat, muscle aches or trouble sleeping. These should go away after 24 hours.  9. Do not make important or legal decisions.   10. It is recommended to avoid smoking.               Tips for taking pain medications  To get the best pain relief possible, remember these points:    Take pain medications as directed, before pain becomes severe.    Pain medication can upset your stomach: taking it with food may help.    Constipation is a common side effect of pain medication. Drink plenty of  fluids.    Eat foods high in fiber. Take a stool softener if recommended by your doctor or pharmacist.    Do not drink alcohol, drive or operate machinery while taking pain medications.    Ask about other ways to control pain, such as with heat, ice or relaxation.    Tylenol/Acetaminophen Consumption  To help encourage the safe use of acetaminophen, the makers of TYLENOL  have lowered the maximum daily dose for single-ingredient Extra Strength TYLENOL   (acetaminophen) products sold in the [place] from 8 pills per day (4,000 mg) to 6 pills per day (3,000 mg). The dosing interval has also changed from 2 pills every 4-6 hours to 2 pills every 6 hours.    If you feel your pain relief is insufficient, you may take Tylenol/Acetaminophen in addition to your narcotic pain medication.     Be careful not to exceed 3,000 mg of Tylenol/Acetaminophen in a 24 hour period from all sources.    If you are taking extra strength Tylenol/acetaminophen (500 mg), the maximum dose is 6 tablets in 24 hours.    If you are taking regular strength acetaminophen (325 mg), the maximum dose is 9 tablets in 24 hours.        Call a doctor for any of the followin. Signs of infection (fever, growing tenderness at the surgery site, a large amount of drainage or bleeding, severe pain, foul-smelling drainage, redness, swelling).  2. It has been over 8 to 10 hours since surgery and you are still not able to urinate (pass water).  3. Headache for over 24 hours.  4. Numbness, tingling or weakness the day after surgery (if you had spinal anesthesia).  5. Signs of Covid-19 infection (temperature over 100 degrees, shortness of breath, cough, loss of taste/smell, generalized body aches, persistent headache, chills, sore throat, nausea/vomiting/diarrhea)  Your doctor is:       Dr. Aletha Early, Ophthalmology: [phone]               Or dial [phone] and ask for the resident on call for:  Ophthalmology  For emergency care, call the:  [facility]:  [phone] (TTY for hearing impaired: [phone])

## 2021-07-23 NOTE — PROCEDURES
Ophthalmology Post-op Procedure Note    Surgical Service:    Ophthalmology & Visual Sciences  Date Performed:      July 23, 2021  Location: [facility]      Pre-operative Diagnosis: Visually significant cataract, right eye  Post-operative Diagnosis:  Pseudophakia, right eye  Operative Procedure:  Phacoemulsification with intraocular lens implantation, right eye      Surgeon(s):  Fellow/Staff Surgeon:       Aletha Early MD  Resident Surgeon:            Kasia Allan MD    Anesthesia:   Retrobulbar/MAC  Findings: Extremely dense brunescent and white cataract with no red reflex  Blood Loss:    Minimal  Implants:  ZCB00 25.5 intraocular lens  Specimens:  None     Complications:  The patient did not experience any complications.   Condition: Stable    Operative Report Completion:    Description of Operation/Procedure:  After appropriate informed consent was obtained, the appropriate cardiac and blood pressure monitors were placed. The patient was brought to the operating room. A final pause occurred just before the start of the procedure during which the entire procedure team actively confirmed the correct patient, procedure, site, special equipment and special requirements. Under monitored anesthesia care, the patient was sedated with intravenous anesthesia. While the patient was sedated, retrobulbar anesthesia was achieved with 4 cc of 1% lidocaine, 0.375% bupivacaine and 75 units of Vitrase. An additional 1 cc of this anesthetic mixture was given around the orbicularis oculi muscle as the needle was being withdrawn from the muscle cone. The patient was prepped and draped in the usual sterile fashion using 5% povidone/iodine.     An eyelid speculum was placed to open the eyelids. A paracentesis port was placed approximately sixty degrees to the left of the planned temporal incision location using the sideport blade. Approximately 0.8 cc of 1% nonpreserved lidocaine was placed into the anterior chamber. Air was  injected in the anterior chamber followed by Trypan blue. Trypan blue was necessary to provide better visualization of the anterior capsule. The anterior chamber was filled with dispersive viscoelastic. A clear corneal temporal incision was made with a metal 2.6 mm keratome. A round continuous tear capsulorhexis was initiated with a cystotome and completed with the Utrata forceps. Balanced salt solution on a cannula was used to perform hydrodissection. The nucleus was removed using phacoemulsification with a chop technique. The remaining cortical material was removed using the irrigation/aspiration handpiece. The capsular bag was filled with cohesive viscoelastic. A lens of the model and power listed above was placed into the capsular bag using the cartridge injection system. The remaining viscoelastic was removed using the irrigation aspiration handpiece. The paracentesis and temporal wounds were hydrated with balanced salt solution. Intracameral moxifloxacin was administered. At the conclusion of the case, the wounds were felt to be watertight, the pupil was round, the lens was centered, and the anterior chamber was deep. The eyelid speculum was removed. Antibiotic ointment was administered to the operative eye. A pressure patch was placed over the operative eye.      Attending Attestation:  I was present for and performed the entire procedure.  Aletha Early MD

## 2021-07-23 NOTE — ANESTHESIA POSTPROCEDURE EVALUATION
Patient: Jada Latham    Procedure(s):  RIGHT EYE CATARACT REMOVAL WITH INTRAOCULAR LENS IMPLANT    Diagnosis:Mature cataract [H26.8]  Diagnosis Additional Information: No value filed.    Anesthesia Type:  MAC    Note:  Disposition: Outpatient   Postop Pain Control: Uneventful            Sign Out: Well controlled pain   PONV:    Neuro/Psych: Uneventful            Sign Out: Acceptable/Baseline neuro status   Airway/Respiratory: Uneventful            Sign Out: Acceptable/Baseline resp. status   CV/Hemodynamics: Uneventful            Sign Out: Acceptable CV status; No obvious hypovolemia; No obvious fluid overload   Other NRE:    DID A NON-ROUTINE EVENT OCCUR?            Last vitals:  Vitals Value Taken Time   /58 07/23/21 1152   Temp 36.4  C (97.6  F) 07/23/21 1152   Pulse 62 07/23/21 1152   Resp 18 07/23/21 1152   SpO2 99 % 07/23/21 1152       Electronically Signed By: Ray Jackson MD  July 23, 2021  12:22 PM

## 2021-07-23 NOTE — ANESTHESIA CARE TRANSFER NOTE
Patient: Jada Latham    Procedure(s):  RIGHT EYE CATARACT REMOVAL WITH INTRAOCULAR LENS IMPLANT    Diagnosis: Mature cataract [H26.8]  Diagnosis Additional Information: No value filed.    Anesthesia Type:   MAC     Note:    Oropharynx: oropharynx clear of all foreign objects  Level of Consciousness: awake  Oxygen Supplementation: room air    Independent Airway: airway patency satisfactory and stable  Dentition: dentition unchanged  Vital Signs Stable: post-procedure vital signs reviewed and stable  Report to RN Given: handoff report given  Patient transferred to: Phase II  Comments: Vital signs per nursing documentation.     Handoff Report: Identifed the Patient, Identified the Reponsible Provider, Reviewed the pertinent medical history, Discussed the surgical course, Reviewed Intra-OP anesthesia mangement and issues during anesthesia, Set expectations for post-procedure period and Allowed opportunity for questions and acknowledgement of understanding      Vitals:  Vitals Value Taken Time   BP     Temp     Pulse     Resp     SpO2         Electronically Signed By: LETICIA Garcia CRNA  July 23, 2021  11:48 AM

## 2021-08-03 DIAGNOSIS — H26.8 MATURE CATARACT: ICD-10-CM

## 2021-08-03 DIAGNOSIS — H25.812 COMBINED FORM OF AGE-RELATED CATARACT, LEFT EYE: Primary | ICD-10-CM

## 2021-08-03 RX ORDER — OFLOXACIN 3 MG/ML
SOLUTION/ DROPS OPHTHALMIC
Qty: 5 ML | Refills: 0 | Status: SHIPPED | OUTPATIENT
Start: 2021-08-03 | End: 2021-08-06

## 2021-08-03 RX ORDER — PREDNISOLONE ACETATE 10 MG/ML
SUSPENSION/ DROPS OPHTHALMIC
Qty: 5 ML | Refills: 1 | Status: SHIPPED | OUTPATIENT
Start: 2021-08-03 | End: 2021-08-06

## 2021-08-04 ENCOUNTER — APPOINTMENT (OUTPATIENT)
Dept: LAB | Facility: CLINIC | Age: 76
End: 2021-08-04
Attending: OPHTHALMOLOGY
Payer: MEDICARE

## 2021-08-04 LAB — SARS-COV-2 RNA RESP QL NAA+PROBE: NEGATIVE

## 2021-08-04 PROCEDURE — U0003 INFECTIOUS AGENT DETECTION BY NUCLEIC ACID (DNA OR RNA); SEVERE ACUTE RESPIRATORY SYNDROME CORONAVIRUS 2 (SARS-COV-2) (CORONAVIRUS DISEASE [COVID-19]), AMPLIFIED PROBE TECHNIQUE, MAKING USE OF HIGH THROUGHPUT TECHNOLOGIES AS DESCRIBED BY CMS-2020-01-R: HCPCS | Performed by: OPHTHALMOLOGY

## 2021-08-05 ENCOUNTER — ANESTHESIA EVENT (OUTPATIENT)
Dept: SURGERY | Facility: AMBULATORY SURGERY CENTER | Age: 76
End: 2021-08-05
Payer: MEDICARE

## 2021-08-06 ENCOUNTER — HOSPITAL ENCOUNTER (OUTPATIENT)
Facility: AMBULATORY SURGERY CENTER | Age: 76
End: 2021-08-06
Attending: OPHTHALMOLOGY
Payer: MEDICARE

## 2021-08-06 ENCOUNTER — OFFICE VISIT (OUTPATIENT)
Dept: OPHTHALMOLOGY | Facility: CLINIC | Age: 76
End: 2021-08-06
Payer: MEDICARE

## 2021-08-06 ENCOUNTER — ANESTHESIA (OUTPATIENT)
Dept: SURGERY | Facility: AMBULATORY SURGERY CENTER | Age: 76
End: 2021-08-06
Payer: MEDICARE

## 2021-08-06 VITALS
DIASTOLIC BLOOD PRESSURE: 56 MMHG | BODY MASS INDEX: 30.92 KG/M2 | RESPIRATION RATE: 18 BRPM | HEIGHT: 67 IN | SYSTOLIC BLOOD PRESSURE: 118 MMHG | TEMPERATURE: 97.5 F | HEART RATE: 58 BPM | WEIGHT: 197 LBS | OXYGEN SATURATION: 98 %

## 2021-08-06 DIAGNOSIS — H26.8 MATURE CATARACT: Primary | ICD-10-CM

## 2021-08-06 DIAGNOSIS — H25.812 COMBINED FORM OF AGE-RELATED CATARACT, LEFT EYE: ICD-10-CM

## 2021-08-06 DIAGNOSIS — H25.811 COMBINED FORM OF AGE-RELATED CATARACT, RIGHT EYE: ICD-10-CM

## 2021-08-06 DIAGNOSIS — Z96.1 PSEUDOPHAKIA, BOTH EYES: Primary | ICD-10-CM

## 2021-08-06 PROCEDURE — 99024 POSTOP FOLLOW-UP VISIT: CPT | Mod: GC | Performed by: OPHTHALMOLOGY

## 2021-08-06 PROCEDURE — 66982 XCAPSL CTRC RMVL CPLX WO ECP: CPT | Mod: LT

## 2021-08-06 DEVICE — EYE IMP IOL AMO PCL TECNIS ZCB00 25.0: Type: IMPLANTABLE DEVICE | Site: EYE | Status: FUNCTIONAL

## 2021-08-06 RX ORDER — ONDANSETRON 2 MG/ML
4 INJECTION INTRAMUSCULAR; INTRAVENOUS EVERY 30 MIN PRN
Status: DISCONTINUED | OUTPATIENT
Start: 2021-08-06 | End: 2021-08-07 | Stop reason: HOSPADM

## 2021-08-06 RX ORDER — HYDROMORPHONE HYDROCHLORIDE 1 MG/ML
0.2 INJECTION, SOLUTION INTRAMUSCULAR; INTRAVENOUS; SUBCUTANEOUS EVERY 5 MIN PRN
Status: DISCONTINUED | OUTPATIENT
Start: 2021-08-06 | End: 2021-08-06 | Stop reason: HOSPADM

## 2021-08-06 RX ORDER — OFLOXACIN 3 MG/ML
SOLUTION/ DROPS OPHTHALMIC
Qty: 5 ML | Refills: 0 | Status: SHIPPED | OUTPATIENT
Start: 2021-08-06 | End: 2023-08-16

## 2021-08-06 RX ORDER — OFLOXACIN 3 MG/ML
1 SOLUTION/ DROPS OPHTHALMIC
Status: COMPLETED | OUTPATIENT
Start: 2021-08-06 | End: 2021-08-06

## 2021-08-06 RX ORDER — MEPERIDINE HYDROCHLORIDE 25 MG/ML
12.5 INJECTION INTRAMUSCULAR; INTRAVENOUS; SUBCUTANEOUS
Status: DISCONTINUED | OUTPATIENT
Start: 2021-08-06 | End: 2021-08-07 | Stop reason: HOSPADM

## 2021-08-06 RX ORDER — PREDNISOLONE ACETATE 10 MG/ML
SUSPENSION/ DROPS OPHTHALMIC
Qty: 5 ML | Refills: 1 | Status: SHIPPED | OUTPATIENT
Start: 2021-08-06 | End: 2023-08-16

## 2021-08-06 RX ORDER — TETRACAINE HYDROCHLORIDE 5 MG/ML
SOLUTION OPHTHALMIC PRN
Status: DISCONTINUED | OUTPATIENT
Start: 2021-08-06 | End: 2021-08-06 | Stop reason: HOSPADM

## 2021-08-06 RX ORDER — HYDRALAZINE HYDROCHLORIDE 20 MG/ML
5 INJECTION INTRAMUSCULAR; INTRAVENOUS EVERY 10 MIN PRN
Status: DISCONTINUED | OUTPATIENT
Start: 2021-08-06 | End: 2021-08-06 | Stop reason: HOSPADM

## 2021-08-06 RX ORDER — FENTANYL CITRATE 50 UG/ML
25 INJECTION, SOLUTION INTRAMUSCULAR; INTRAVENOUS EVERY 5 MIN PRN
Status: DISCONTINUED | OUTPATIENT
Start: 2021-08-06 | End: 2021-08-06 | Stop reason: HOSPADM

## 2021-08-06 RX ORDER — BALANCED SALT SOLUTION 6.4; .75; .48; .3; 3.9; 1.7 MG/ML; MG/ML; MG/ML; MG/ML; MG/ML; MG/ML
SOLUTION OPHTHALMIC PRN
Status: DISCONTINUED | OUTPATIENT
Start: 2021-08-06 | End: 2021-08-06 | Stop reason: HOSPADM

## 2021-08-06 RX ORDER — OXYCODONE HCL 5 MG/5 ML
5 SOLUTION, ORAL ORAL EVERY 4 HOURS PRN
Status: DISCONTINUED | OUTPATIENT
Start: 2021-08-06 | End: 2021-08-07 | Stop reason: HOSPADM

## 2021-08-06 RX ORDER — ACETAMINOPHEN 325 MG/1
975 TABLET ORAL ONCE
Status: DISCONTINUED | OUTPATIENT
Start: 2021-08-06 | End: 2021-08-06 | Stop reason: HOSPADM

## 2021-08-06 RX ORDER — LIDOCAINE HYDROCHLORIDE 20 MG/ML
INJECTION, SOLUTION INFILTRATION; PERINEURAL PRN
Status: DISCONTINUED | OUTPATIENT
Start: 2021-08-06 | End: 2021-08-06

## 2021-08-06 RX ORDER — SODIUM CHLORIDE, SODIUM LACTATE, POTASSIUM CHLORIDE, CALCIUM CHLORIDE 600; 310; 30; 20 MG/100ML; MG/100ML; MG/100ML; MG/100ML
INJECTION, SOLUTION INTRAVENOUS CONTINUOUS
Status: DISCONTINUED | OUTPATIENT
Start: 2021-08-06 | End: 2021-08-06 | Stop reason: HOSPADM

## 2021-08-06 RX ORDER — MOXIFLOXACIN IN NACL,ISO-OS/PF 0.3MG/0.3
SYRINGE (ML) INTRAOCULAR PRN
Status: DISCONTINUED | OUTPATIENT
Start: 2021-08-06 | End: 2021-08-06 | Stop reason: HOSPADM

## 2021-08-06 RX ORDER — SODIUM CHLORIDE, SODIUM LACTATE, POTASSIUM CHLORIDE, CALCIUM CHLORIDE 600; 310; 30; 20 MG/100ML; MG/100ML; MG/100ML; MG/100ML
INJECTION, SOLUTION INTRAVENOUS CONTINUOUS
Status: DISCONTINUED | OUTPATIENT
Start: 2021-08-06 | End: 2021-08-07 | Stop reason: HOSPADM

## 2021-08-06 RX ORDER — CYCLOPENTOLAT/TROPIC/PHENYLEPH 1%-1%-2.5%
1 DROPS (EA) OPHTHALMIC (EYE)
Status: COMPLETED | OUTPATIENT
Start: 2021-08-06 | End: 2021-08-06

## 2021-08-06 RX ORDER — LABETALOL HYDROCHLORIDE 5 MG/ML
5 INJECTION, SOLUTION INTRAVENOUS
Status: DISCONTINUED | OUTPATIENT
Start: 2021-08-06 | End: 2021-08-06 | Stop reason: HOSPADM

## 2021-08-06 RX ORDER — DICLOFENAC SODIUM 1 MG/ML
1 SOLUTION/ DROPS OPHTHALMIC
Status: COMPLETED | OUTPATIENT
Start: 2021-08-06 | End: 2021-08-06

## 2021-08-06 RX ORDER — PROPOFOL 10 MG/ML
INJECTION, EMULSION INTRAVENOUS PRN
Status: DISCONTINUED | OUTPATIENT
Start: 2021-08-06 | End: 2021-08-06

## 2021-08-06 RX ORDER — ONDANSETRON 4 MG/1
4 TABLET, ORALLY DISINTEGRATING ORAL EVERY 30 MIN PRN
Status: DISCONTINUED | OUTPATIENT
Start: 2021-08-06 | End: 2021-08-07 | Stop reason: HOSPADM

## 2021-08-06 RX ADMIN — DICLOFENAC SODIUM 1 DROP: 1 SOLUTION/ DROPS OPHTHALMIC at 09:09

## 2021-08-06 RX ADMIN — Medication 1 DROP: at 09:20

## 2021-08-06 RX ADMIN — PROPOFOL 50 MG: 10 INJECTION, EMULSION INTRAVENOUS at 09:45

## 2021-08-06 RX ADMIN — OFLOXACIN 1 DROP: 3 SOLUTION/ DROPS OPHTHALMIC at 09:13

## 2021-08-06 RX ADMIN — OFLOXACIN 1 DROP: 3 SOLUTION/ DROPS OPHTHALMIC at 09:09

## 2021-08-06 RX ADMIN — OFLOXACIN 1 DROP: 3 SOLUTION/ DROPS OPHTHALMIC at 09:20

## 2021-08-06 RX ADMIN — DICLOFENAC SODIUM 1 DROP: 1 SOLUTION/ DROPS OPHTHALMIC at 09:13

## 2021-08-06 RX ADMIN — Medication 1 DROP: at 09:13

## 2021-08-06 RX ADMIN — Medication 1 DROP: at 09:09

## 2021-08-06 RX ADMIN — SODIUM CHLORIDE, SODIUM LACTATE, POTASSIUM CHLORIDE, CALCIUM CHLORIDE: 600; 310; 30; 20 INJECTION, SOLUTION INTRAVENOUS at 09:38

## 2021-08-06 RX ADMIN — LIDOCAINE HYDROCHLORIDE 80 MG: 20 INJECTION, SOLUTION INFILTRATION; PERINEURAL at 09:44

## 2021-08-06 RX ADMIN — DICLOFENAC SODIUM 1 DROP: 1 SOLUTION/ DROPS OPHTHALMIC at 09:20

## 2021-08-06 ASSESSMENT — VISUAL ACUITY
METHOD: SNELLEN - LINEAR
OS_SC: 20/200

## 2021-08-06 ASSESSMENT — TONOMETRY
IOP_METHOD: TONOPEN
OS_IOP_MMHG: 23

## 2021-08-06 ASSESSMENT — MIFFLIN-ST. JEOR: SCORE: 1421.22

## 2021-08-06 ASSESSMENT — LIFESTYLE VARIABLES: TOBACCO_USE: 1

## 2021-08-06 ASSESSMENT — ENCOUNTER SYMPTOMS: SEIZURES: 0

## 2021-08-06 NOTE — ANESTHESIA CARE TRANSFER NOTE
Patient: Jada Latham    Procedure(s):  COMPLEX LEFT EYE CATARACT REMOVAL WITH INTRAOCULAR LENS IMPLANT    Diagnosis: Mature cataract [H26.8]  Diagnosis Additional Information: No value filed.    Anesthesia Type:   MAC     Note:    Oropharynx: spontaneously breathing  Level of Consciousness: awake  Oxygen Supplementation: room air    Independent Airway: airway patency satisfactory and stable  Dentition: dentition unchanged  Vital Signs Stable: post-procedure vital signs reviewed and stable  Report to RN Given: handoff report given  Patient transferred to: Phase II    Handoff Report: Identifed the Patient, Identified the Reponsible Provider, Reviewed the pertinent medical history, Discussed the surgical course, Reviewed Intra-OP anesthesia mangement and issues during anesthesia, Set expectations for post-procedure period and Allowed opportunity for questions and acknowledgement of understanding      Vitals:  Vitals Value Taken Time   /66 08/06/21 1026   Temp 36.1  C (97  F) 08/06/21 1026   Pulse 60 08/06/21 1026   Resp 18 08/06/21 1026   SpO2 97 % 08/06/21 1026       Electronically Signed By: LETICIA Lawton CRNA  August 6, 2021  10:28 AM

## 2021-08-06 NOTE — ANESTHESIA PREPROCEDURE EVALUATION
"Anesthesia Pre-Procedure Evaluation    Patient: Jada Latham   MRN: [id number] : 1945        Preoperative Diagnosis: Mature cataract [H26.8]   Procedure : Procedure(s):  RIGHT EYE CATARACT REMOVAL WITH INTRAOCULAR LENS IMPLANT     Past Medical History:   Diagnosis Date     Cataract      HLD (hyperlipidemia)      Obesity      Schizoaffective disorder, bipolar type (H)       Past Surgical History:   Procedure Laterality Date     PHACOEMULSIFICATION CLEAR CORNEA WITH STANDARD INTRAOCULAR LENS IMPLANT Right 2021    Procedure: RIGHT EYE CATARACT REMOVAL WITH INTRAOCULAR LENS IMPLANT;  Surgeon: Aletha Early MD;  Location: [facility] OR     TONSILLECTOMY      as a child      Allergies   Allergen Reactions     No Clinical Screening - See Comments      Pt does not know the name of medications that caused allergic reactions --she states to \"refer to the court documents\"  Pt also states she is \"allergic to all prescriptions because they put chemicals in them.\"      Social History     Tobacco Use     Smoking status: Former Smoker     Packs/day: 1.00     Years: 25.00     Pack years: 25.00     Quit date:      Years since quittin.6     Smokeless tobacco: Never Used     Tobacco comment: started at age 13 yearsn with 1 cigarette a week in high school and college   Substance Use Topics     Alcohol use: No      Wt Readings from Last 1 Encounters:   21 89.4 kg (197 lb)        Anesthesia Evaluation   Pt has had prior anesthetic. Type: General.    No history of anesthetic complications       ROS/MED HX  ENT/Pulmonary: Comment: cataract    (+) tobacco use (the patient smoked for 25 years and started 1 cigarette per week but at the end was at 1 ppd. She quit in ), Past use,     Neurologic: Comment: RLS - patient reports this is resolved.     Possible parkinsonism 2/2 medications and followed by [facility] providers   (-) no seizures, no CVA and no TIA   Cardiovascular:     (+) Dyslipidemia -----Previous cardiac " testing   Echo: Date: Results:    Stress Test: Date: Results:    ECG Reviewed: Date: 2012 Results:  Normal sinus rhythm   Possible Left atrial enlargement   Borderline ECG    Cath: Date: Results:      METS/Exercise Tolerance: 3 - Able to walk 1-2 blocks without stopping    Hematologic:  - neg hematologic  ROS     Musculoskeletal:  - neg musculoskeletal ROS     GI/Hepatic:  - neg GI/hepatic ROS  (-) GERD   Renal/Genitourinary:  - neg Renal ROS     Endo:  - neg endo ROS   (+) Obesity,     Psychiatric/Substance Use:     (+) psychiatric history schizophrenia and bipolar     Infectious Disease:  - neg infectious disease ROS     Malignancy:  - neg malignancy ROS     Other:  - neg other ROS          Physical Exam    Airway        Mallampati: II   TM distance: > 3 FB   Neck ROM: limited   Mouth opening: > 3 cm    Respiratory Devices and Support         Dental           Cardiovascular   cardiovascular exam normal          Pulmonary   pulmonary exam normal                OUTSIDE LABS:  CBC: No results found for: WBC, HGB, HCT, PLT  BMP: No results found for: NA, POTASSIUM, CHLORIDE, CO2, BUN, CR, GLC  COAGS: No results found for: PTT, INR, FIBR  POC: No results found for: BGM, HCG, HCGS  HEPATIC: No results found for: ALBUMIN, PROTTOTAL, ALT, AST, GGT, ALKPHOS, BILITOTAL, BILIDIRECT, PRADEEP  OTHER: No results found for: PH, LACT, A1C, GOMEZ, PHOS, MAG, LIPASE, AMYLASE, TSH, T4, T3, CRP, SED    Anesthesia Plan    ASA Status:  2   NPO Status:  NPO Appropriate    Anesthesia Type: MAC.     - Reason for MAC: straight local not clinically adequate   Induction: Intravenous.   Maintenance: TIVA.        Consents    Anesthesia Plan(s) and associated risks, benefits, and realistic alternatives discussed. Questions answered and patient/representative(s) expressed understanding.     - Discussed with:  Patient      - Extended Intubation/Ventilatory Support Discussed: No.      - Patient is DNR/DNI Status: No    Use of blood products discussed:  No .     Postoperative Care    Pain management: Multi-modal analgesia.   PONV prophylaxis: Ondansetron (or other 5HT-3)     Comments:                PAC Discussion and Assessment    ASA Classification: 2  Case is suitable for: ASC  Anesthetic techniques and relevant risks discussed: MAC with GA as backup                  PAC Resident/NP Anesthesia Assessment: Jada is a 75 year old woman who is scheduled for RIGHT EYE CATARACT REMOVAL WITH INTRAOCULAR LENS IMPLANT on 7/23/21 by Dr. Early in treatment of Mature cataract.  PAC referral for risk assessment and optimization for anesthesia with comorbid conditions of obesity, former smoker, schizoaffective disorder, bipolar type I with manic episodes, possible parkinsonism secondary to medications:     Pre-operative considerations:  1.  Cardiac:  Functional status- METS 4. The patient reports she works with [facility]/OT at the [facility] for 30 minutes a day. She walks in place for 200 steps 15-20 times a day. She denies any cardiac symptoms. Low risk surgery with 0.4% (RCRI 0) risk of major adverse cardiac event.   -denies CP, SOB, CHAVEZ, palpitations, lightheadedness, syncope  -denies cardiac history. The patient did have testing showing HLD but not on medications. She had an EKG in 2012 with sinus rhythm and possible left axis deviation.      2.  Pulm:  Airway feasible.  CHRISTOPHER risk:  Low, STOPBANG 2/8  -former smoker - the patient smoked for 25 years and started 1 cigarette per week but at the end was at 1 ppd. She quit in 1993  ~She denies URI symptoms.      3.  GI:  Risk of PONV score = 2.  If > 2, anti-emetic intervention recommended.    4. Neuro: The patient is being monitoring at her care facility for possible parkinsonism 2/2 to medications.   ~ The patient reports her medications used to give her RLS but since stopping medications this has resolved.     5. Endo: Obesity, BMI 36 - consideration for safe lifting techniques.     6. : The patient reports she had urinary  incontinence secondary to medications and that since stopping medications this has resolved. She denies any UTI symptoms.     7. Psych: Schizoaffective disorder and bipolar with manic episodes - the patient was hospitalized in April 2021 and is currently under [facility] commitment. She reports today that she is not taking any medications and that the reason for her cataracts is that extra stuff they put in medications. She reports she has done extensive studies on this. Continue Haldol. Have messaged surgical team about patient's [facility] commitment and need for consent from POA if the patient has one.     ** I was able to speak with the patient's  and she confirmed that the patient is her own legal guardian so is able to consent for her procedure. **     VTE risk: 0.5%    Patient is optimized and is acceptable candidate for the proposed procedure.  No further diagnostic evaluation is needed.      Patient discussed with Dr. Xie    For further details of assessment, testing, and physical exam please see H and P completed on same date     Jasmin Jang PA-C       Mid-Level Provider/Resident: Jasmin Jang PA-C  Date: 7/21/21      Attending Anesthesiologist Anesthesia Assessment: Surgeon will obtain  consent for procedure  from court appointed  power of , please include anesthesia sedation, or have their contact  phone number available.  Reviewed and Signed by PAC Anesthesiologist  Anesthesiologist: Rojas  Date: 7/21/21                     CALEB LEGGETT MD

## 2021-08-06 NOTE — PROGRESS NOTES
POD#0, status post cataract surgery, Left eye    No complaints.  Denies eye pain.    Impression/Plan:  Pseudophakia, OS: POD0, good post-operative appearance. IOP reasonable.    Eye protection at all times and eye shield at night for 1 week.    Limited activities with no exercise or heavy lifting for 1 week.    Instructed patient to contact us for decreasing vision, eye pain, new floaters or flashes of light or other concerning symptoms.    Written instructions given  Return to clinic 8/11/221.    Iggy Jasso MD - PGY3  Department of Ophthalmology  Pager: [phone]    Teaching statement:  Complete documentation of historical and exam elements from today's encounter can be found in the full encounter summary report (not reduplicated in this progress note). I personally obtained the chief complaint(s) and history of present illness.  I confirmed and edited as necessary the review of systems, past medical/surgical history, family history, social history, and examination findings as documented by others; and I examined the patient myself. I personally reviewed the relevant tests, images, and reports as documented above.     I formulated and edited as necessary the assessment and plan and discussed the findings and management plan with the patient and family.    Aletha Early MD  Comprehensive Ophthalmology & Ocular Pathology  Department of Ophthalmology and Visual Neurosciences  [email or web address]  Pager [phone]

## 2021-08-06 NOTE — PROCEDURES
Ophthalmology Post-op Procedure Note    Surgical Service:    Ophthalmology & Visual Sciences  Date Performed:      August 6, 2021  Location: [facility]      Pre-operative Diagnosis: Visually significant cataract, left eye  Post-operative Diagnosis:  Pseudophakia, left eye  Operative Procedure:  Phacoemulsification with intraocular lens implantation, left eye    Surgeon(s):  Fellow/Staff Surgeon:       Aletha Early MD  Resident Surgeon:            Iggy Jasso    Anesthesia:   Retrobulbar/MAC  Findings: Very dense white cataract with no red reflex  Blood Loss:    Minimal  Implants:  ZCB00 25.0 intraocular lens  Specimens:  None     Complications:  The patient did not experience any complications.   Condition: Stable    Operative Report Completion:    Description of Operation/Procedure:  After appropriate informed consent was obtained, the appropriate cardiac and blood pressure monitors were placed. The patient was brought to the operating room. A final pause occurred just before the start of the procedure during which the entire procedure team actively confirmed the correct patient, procedure, site, special equipment and special requirements. Under monitored anesthesia care, the patient was sedated with intravenous anesthesia. While the patient was sedated, retrobulbar anesthesia was achieved with 4 cc of 1% lidocaine, 0.375% bupivacaine and 75 units of Vitrase. An additional 1 cc of this anesthetic mixture was given around the orbicularis oculi muscle as the needle was being withdrawn from the muscle cone. The patient was prepped and draped in the usual sterile fashion using 5% povidone/iodine.     An eyelid speculum was placed to open the eyelids. A paracentesis port was placed approximately sixty degrees to the left of the planned temporal incision location using the sideport blade.  Air was injected in the anterior chamber followed by Trypan blue. Trypan blue was necessary to provide better visualization  of the anterior capsule. The anterior chamber was filled with dispersive viscoelastic. A clear corneal temporal incision was made with a metal 2.6 mm keratome. A round continuous tear capsulorhexis was initiated with a cystotome and completed with the Utrata forceps. Balanced salt solution on a cannula was used to perform hydrodissection. The nucleus was removed using phacoemulsification with a chop technique. The remaining cortical material was removed using the irrigation/aspiration handpiece. The capsular bag was filled with Provisc. A lens of the power and model listed above was placed into the capsular bag using the cartridge injection system. The remaining viscoelastic was removed using the irrigation aspiration handpiece. Intracameral moxifloxacin was administered. The paracentesis and temporal wounds were hydrated with balanced salt solution. At the conclusion of the case, the wounds were felt to be watertight, the pupil was round, the lens was centered, and the anterior chamber was deep. The eyelid speculum was removed. Antibiotic ointment was administered to the operative eye. A pressure patch was placed over the operative eye.      Attending Attestation:  I was present for and performed the entire procedure.  Aletha Early MD

## 2021-08-06 NOTE — DISCHARGE INSTRUCTIONS
[facility]  Home Care Following Anesthesia  For 24 hours after surgery:  1. Get plenty of rest.  A responsible adult must stay with you for at least 24 hours after you leave the surgery center.  2. Do not drive or use heavy equipment.  If you have weakness or tingling, don't drive or use heavy equipment until this feeling goes away.   3. Do not drink alcohol.   4. Avoid strenuous or risky activities.  Ask for help when climbing stairs.  5. You may feel lightheaded.  IF so, sit for a few minutes before standing.  Have someone help you get up.   6. If you have nausea (feel sick to your stomach): Drink only clear liquids such as apple juice, ginger ale, broth or 7-Up.  Rest may also help.  Be sure to drink enough fluids.  Move to a regular diet as you feel able.   7. You may have a slight fever.  Call the doctor if your fever is over 100 F (37.7 C) (taken under the tongue) or lasts longer than 24 hours.  8. You may have a dry mouth, a sore throat, muscle aches or trouble sleeping. These should go away after 24 hours.  9. Do not make important or legal decisions.   10. It is recommended to avoid smoking.               Tips for taking pain medications  To get the best pain relief possible, remember these points:    Take pain medications as directed, before pain becomes severe.    Pain medication can upset your stomach: taking it with food may help.    Constipation is a common side effect of pain medication. Drink plenty of  fluids.    Eat foods high in fiber. Take a stool softener if recommended by your doctor or pharmacist.    Do not drink alcohol, drive or operate machinery while taking pain medications.    Ask about other ways to control pain, such as with heat, ice or relaxation.    Tylenol/Acetaminophen Consumption  To help encourage the safe use of acetaminophen, the makers of TYLENOL  have lowered the maximum daily dose for single-ingredient Extra Strength TYLENOL   (acetaminophen) products sold in the [place] from 8 pills per day (4,000 mg) to 6 pills per day (3,000 mg). The dosing interval has also changed from 2 pills every 4-6 hours to 2 pills every 6 hours.    If you feel your pain relief is insufficient, you may take Tylenol/Acetaminophen in addition to your narcotic pain medication.     Be careful not to exceed 3,000 mg of Tylenol/Acetaminophen in a 24 hour period from all sources.    If you are taking extra strength Tylenol/acetaminophen (500 mg), the maximum dose is 6 tablets in 24 hours.    If you are taking regular strength acetaminophen (325 mg), the maximum dose is 9 tablets in 24 hours.    Call a doctor for any of the followin. Signs of infection (fever, growing tenderness at the surgery site, a large amount of drainage or bleeding, severe pain, foul-smelling drainage, redness, swelling).  2. It has been over 8 to 10 hours since surgery and you are still not able to urinate (pass water).  3. Headache for over 24 hours.  4. Numbness, tingling or weakness the day after surgery (if you had spinal anesthesia).  5. Signs of Covid-19 infection (temperature over 100 degrees, shortness of breath, cough, loss of taste/smell, generalized body aches, persistent headache, chills, sore throat, nausea/vomiting/diarrhea)  Your doctor is:       Dr. Aletha Early, Ophthalmology: [phone]               Or dial [phone] and ask for the resident on call for:  Ophthalmology  For emergency care, call the:  [facility]:  [phone] (TTY for hearing impaired: [phone])

## 2021-08-06 NOTE — ANESTHESIA POSTPROCEDURE EVALUATION
Patient: Jada Latham    Procedure(s):  COMPLEX LEFT EYE CATARACT REMOVAL WITH INTRAOCULAR LENS IMPLANT    Diagnosis:Mature cataract [H26.8]  Diagnosis Additional Information: No value filed.    Anesthesia Type:  MAC    Note:  Disposition: Outpatient   Postop Pain Control: Uneventful            Sign Out: Well controlled pain   PONV: No   Neuro/Psych: Uneventful            Sign Out: Acceptable/Baseline neuro status   Airway/Respiratory: Uneventful            Sign Out: Acceptable/Baseline resp. status   CV/Hemodynamics: Uneventful            Sign Out: Acceptable CV status; No obvious hypovolemia; No obvious fluid overload   Other NRE: NONE   DID A NON-ROUTINE EVENT OCCUR? No           Last vitals:  Vitals Value Taken Time   /56 08/06/21 1034   Temp 36.4  C (97.5  F) 08/06/21 1034   Pulse 58 08/06/21 1034   Resp 18 08/06/21 1034   SpO2 98 % 08/06/21 1034       Electronically Signed By: CALEB LEGGETT MD  August 6, 2021  1:31 PM

## 2021-08-11 ENCOUNTER — OFFICE VISIT (OUTPATIENT)
Dept: OPHTHALMOLOGY | Facility: CLINIC | Age: 76
End: 2021-08-11
Attending: OPHTHALMOLOGY
Payer: MEDICARE

## 2021-08-11 DIAGNOSIS — Z96.1 PSEUDOPHAKIA, BOTH EYES: Primary | ICD-10-CM

## 2021-08-11 PROCEDURE — 99024 POSTOP FOLLOW-UP VISIT: CPT | Performed by: OPHTHALMOLOGY

## 2021-08-11 PROCEDURE — G0463 HOSPITAL OUTPT CLINIC VISIT: HCPCS

## 2021-08-11 ASSESSMENT — TONOMETRY
OD_IOP_MMHG: 14
IOP_METHOD: ICARE
OS_IOP_MMHG: 15

## 2021-08-11 ASSESSMENT — VISUAL ACUITY
OS_SC: 20/30-2/+2
METHOD: SNELLEN - LINEAR
OD_SC: 20/25-3

## 2021-08-11 NOTE — PROGRESS NOTES
LEANN  Jada Latham is a 75 year old female here for follow-up after CE/IOL now both eyes. She states she can see even the tiny print. She denies pain, redness, discharge. No flashes/floaters.    POH: No history of eye surgery  PMH: Bipolar/schizoaffective  FH: Mother with floaters    Assessment & Plan    (Z96.1) Pseudophakia, both eyes  (primary encounter diagnosis)  Comment: Good post-operative appearance. POD5 left eye, POW3 right eye. Good uncorrected acuity already (improved from hand motions both eyes.  Plan: Complete drop taper. Refract next visit.    -----------------------------------------------------------------------------------    Patient disposition:   Return for follow-up appointment with refraction and dilation as already scheduled.    Teaching statement:  Complete documentation of historical and exam elements from today's encounter can be found in the full encounter summary report (not reduplicated in this progress note). I personally obtained the chief complaint(s) and history of present illness.  I confirmed and edited as necessary the review of systems, past medical/surgical history, family history, social history, and examination findings as documented by others; and I examined the patient myself. I personally reviewed the relevant tests, images, and reports as documented above.     I formulated and edited as necessary the assessment and plan and discussed the findings and management plan with the patient and family.    Aletha Early MD  Comprehensive Ophthalmology & Ocular Pathology  Department of Ophthalmology and Visual Neurosciences  [email or web address]  Pager [phone]

## 2021-08-25 ENCOUNTER — OFFICE VISIT (OUTPATIENT)
Dept: OPHTHALMOLOGY | Facility: CLINIC | Age: 76
End: 2021-08-25
Attending: OPHTHALMOLOGY
Payer: MEDICARE

## 2021-08-25 DIAGNOSIS — Z96.1 PSEUDOPHAKIA, BOTH EYES: Primary | ICD-10-CM

## 2021-08-25 PROCEDURE — 92015 DETERMINE REFRACTIVE STATE: CPT | Mod: GY

## 2021-08-25 PROCEDURE — 99024 POSTOP FOLLOW-UP VISIT: CPT | Performed by: OPHTHALMOLOGY

## 2021-08-25 PROCEDURE — G0463 HOSPITAL OUTPT CLINIC VISIT: HCPCS

## 2021-08-25 ASSESSMENT — VISUAL ACUITY
OD_SC+: -2
OS_SC: 20/20
METHOD: SNELLEN - LINEAR
OS_SC+: -1
OD_SC: 20/20

## 2021-08-25 ASSESSMENT — CUP TO DISC RATIO
OD_RATIO: 0.4
OS_RATIO: 0.4

## 2021-08-25 ASSESSMENT — TONOMETRY
OD_IOP_MMHG: 07
OS_IOP_MMHG: 08
IOP_METHOD: TONOPEN

## 2021-08-25 ASSESSMENT — CONF VISUAL FIELD
OD_NORMAL: 1
METHOD: COUNTING FINGERS
OS_NORMAL: 1

## 2021-08-25 ASSESSMENT — SLIT LAMP EXAM - LIDS
COMMENTS: NORMAL
COMMENTS: NORMAL

## 2021-08-25 ASSESSMENT — EXTERNAL EXAM - LEFT EYE: OS_EXAM: NORMAL

## 2021-08-25 ASSESSMENT — REFRACTION_MANIFEST
OD_ADD: +2.75
OS_SPHERE: -0.25
OS_CYLINDER: SPHERE
OD_AXIS: 165
OD_SPHERE: PLANO
OS_ADD: +2.75
OD_CYLINDER: +0.50

## 2021-08-25 ASSESSMENT — EXTERNAL EXAM - RIGHT EYE: OD_EXAM: NORMAL

## 2021-08-25 NOTE — PROGRESS NOTES
LEANN Latham is a 75 year old female here for follow-up after CE/IOL now both eyes. She states she can see even the tiny print. She denies pain, redness, discharge. No flashes/floaters.    POH: No history of eye surgery  PMH: Bipolar/schizoaffective  FH: Mother with floaters    Assessment & Plan    (Z96.1) Pseudophakia, both eyes  (primary encounter diagnosis)  Comment: Good post-operative appearance. Good uncorrected acuity already (improved from hand motions both eyes.)  Plan: Complete drop taper. Given updated glasses Rx.    -----------------------------------------------------------------------------------    Patient disposition:   Return in about 1 year (around 8/25/2022) for dilated eye exam, or sooner as needed.      Teaching statement:  Complete documentation of historical and exam elements from today's encounter can be found in the full encounter summary report (not reduplicated in this progress note). I personally obtained the chief complaint(s) and history of present illness.  I confirmed and edited as necessary the review of systems, past medical/surgical history, family history, social history, and examination findings as documented by others; and I examined the patient myself. I personally reviewed the relevant tests, images, and reports as documented above.     I formulated and edited as necessary the assessment and plan and discussed the findings and management plan with the patient and family.    Aletha Early MD  Comprehensive Ophthalmology & Ocular Pathology  Department of Ophthalmology and Visual Neurosciences  [email or web address]  Pager [phone]

## 2021-08-25 NOTE — NURSING NOTE
Chief Complaints and History of Present Illnesses   Patient presents with     Pseudophakia Follow Up     2 week follow up both eyes.     Chief Complaint(s) and History of Present Illness(es)     Pseudophakia Follow Up     Comments: 2 week follow up both eyes.              Comments     Pt states vision has been good in the distance. Pt needs OTC readers for up close.  No eye pain today. No flashes or floaters. No Redness or dryness.    PHILIPPE Longoria August 25, 2021 1:00 PM

## 2021-09-28 ENCOUNTER — TELEPHONE (OUTPATIENT)
Dept: OPHTHALMOLOGY | Facility: CLINIC | Age: 76
End: 2021-09-28

## 2021-09-30 ENCOUNTER — ALLIED HEALTH/NURSE VISIT (OUTPATIENT)
Dept: OPHTHALMOLOGY | Facility: CLINIC | Age: 76
End: 2021-09-30
Attending: OPHTHALMOLOGY
Payer: MEDICARE

## 2021-09-30 DIAGNOSIS — Z96.1 PSEUDOPHAKIA, BOTH EYES: Primary | ICD-10-CM

## 2021-09-30 PROCEDURE — 99207 PR NO CHARGE - REPEAT EYE REFRACTION: CPT | Performed by: OPHTHALMOLOGY

## 2021-09-30 ASSESSMENT — REFRACTION_MANIFEST
OS_AXIS: 161
OD_AXIS: 001
OD_CYLINDER: +1.50
OS_CYLINDER: +0.75
METHOD_AUTOREFRACTION: 1
OS_AXIS: 161
OS_SPHERE: PLANO
OD_ADD: +2.50
OS_CYLINDER: +1.25
OD_SPHERE: -0.25
OD_AXIS: 167
OD_CYLINDER: +1.00
OS_ADD: +2.50
OD_SPHERE: PLANO
OS_SPHERE: -0.50

## 2021-09-30 ASSESSMENT — VISUAL ACUITY
OD_SC: 20/30
OD_CC: 20/30
OS_SC: 20/25
OS_CC: 20/30
CORRECTION_TYPE: GLASSES
OS_SC+: -2
OD_SC+: +2
METHOD: SNELLEN - LINEAR

## 2021-09-30 ASSESSMENT — REFRACTION_WEARINGRX
OS_CYLINDER: SPHERE
OD_SPHERE: +2.75
SPECS_TYPE: SVL READING
OD_CYLINDER: +0.25
OD_AXIS: 063
OS_SPHERE: +2.50

## 2023-08-16 ENCOUNTER — OFFICE VISIT (OUTPATIENT)
Dept: FAMILY MEDICINE | Facility: CLINIC | Age: 78
End: 2023-08-16
Payer: MEDICARE

## 2023-08-16 ENCOUNTER — MEDICAL CORRESPONDENCE (OUTPATIENT)
Dept: HEALTH INFORMATION MANAGEMENT | Facility: CLINIC | Age: 78
End: 2023-08-16

## 2023-08-16 VITALS
DIASTOLIC BLOOD PRESSURE: 73 MMHG | WEIGHT: 210 LBS | OXYGEN SATURATION: 94 % | SYSTOLIC BLOOD PRESSURE: 120 MMHG | TEMPERATURE: 97.7 F | BODY MASS INDEX: 37.21 KG/M2 | HEART RATE: 68 BPM | HEIGHT: 63 IN
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
## 2023-08-16 DIAGNOSIS — F25.0 SCHIZOAFFECTIVE DISORDER, BIPOLAR TYPE (H): ICD-10-CM

## 2023-08-16 DIAGNOSIS — Z13.1 SCREENING FOR DIABETES MELLITUS: ICD-10-CM

## 2023-08-16 LAB
ALBUMIN SERPL BCG-MCNC: 4.1 G/DL (ref 3.5–5.2)
ALP SERPL-CCNC: 84 U/L (ref 35–104)
ALT SERPL W P-5'-P-CCNC: 16 U/L (ref 0–50)
ANION GAP SERPL CALCULATED.3IONS-SCNC: 10 MMOL/L (ref 7–15)
AST SERPL W P-5'-P-CCNC: 33 U/L (ref 0–45)
BASOPHILS # BLD AUTO: 0.1 10E3/UL (ref 0–0.2)
BASOPHILS NFR BLD AUTO: 1 %
BILIRUB SERPL-MCNC: 0.5 MG/DL
BUN SERPL-MCNC: 24.8 MG/DL (ref 8–23)
CALCIUM SERPL-MCNC: 9.4 MG/DL (ref 8.8–10.2)
CHLORIDE SERPL-SCNC: 106 MMOL/L (ref 98–107)
CREAT SERPL-MCNC: 0.65 MG/DL (ref 0.51–0.95)
DEPRECATED HCO3 PLAS-SCNC: 25 MMOL/L (ref 22–29)
EOSINOPHIL # BLD AUTO: 0.2 10E3/UL (ref 0–0.7)
EOSINOPHIL NFR BLD AUTO: 3 %
ERYTHROCYTE [DISTWIDTH] IN BLOOD BY AUTOMATED COUNT: 13.6 % (ref 10–15)
GFR SERPL CREATININE-BSD FRML MDRD: 90 ML/MIN/1.73M2
GLUCOSE SERPL-MCNC: 109 MG/DL (ref 70–99)
HBA1C MFR BLD: 5.5 %
HCT VFR BLD AUTO: 38.9 % (ref 35–47)
HGB BLD-MCNC: 12.6 G/DL (ref 11.7–15.7)
IMM GRANULOCYTES # BLD: 0 10E3/UL
IMM GRANULOCYTES NFR BLD: 0 %
LYMPHOCYTES # BLD AUTO: 1.2 10E3/UL (ref 0.8–5.3)
LYMPHOCYTES NFR BLD AUTO: 23 %
MCH RBC QN AUTO: 30.2 PG (ref 26.5–33)
MCHC RBC AUTO-ENTMCNC: 32.4 G/DL (ref 31.5–36.5)
MCV RBC AUTO: 93 FL (ref 78–100)
MONOCYTES # BLD AUTO: 0.4 10E3/UL (ref 0–1.3)
MONOCYTES NFR BLD AUTO: 8 %
NEUTROPHILS # BLD AUTO: 3.4 10E3/UL (ref 1.6–8.3)
NEUTROPHILS NFR BLD AUTO: 65 %
NRBC # BLD AUTO: 0 10E3/UL
NRBC BLD AUTO-RTO: 0 /100
PLATELET # BLD AUTO: 230 10E3/UL (ref 150–450)
POTASSIUM SERPL-SCNC: 4.1 MMOL/L (ref 3.4–5.3)
PROT SERPL-MCNC: 6.3 G/DL (ref 6.4–8.3)
RBC # BLD AUTO: 4.17 10E6/UL (ref 3.8–5.2)
SODIUM SERPL-SCNC: 141 MMOL/L (ref 136–145)
WBC # BLD AUTO: 5.2 10E3/UL (ref 4–11)

## 2023-08-16 PROCEDURE — 80053 COMPREHEN METABOLIC PANEL: CPT | Mod: ORL | Performed by: NURSE PRACTITIONER

## 2023-08-16 PROCEDURE — 83036 HEMOGLOBIN GLYCOSYLATED A1C: CPT | Mod: ORL | Performed by: NURSE PRACTITIONER

## 2023-08-16 PROCEDURE — 85004 AUTOMATED DIFF WBC COUNT: CPT | Mod: ORL | Performed by: NURSE PRACTITIONER

## 2023-08-16 NOTE — NURSING NOTE
"ROOM:1  CASSIDY FRANCO    Preferred Name: Jada     How did you hear about us?  Other - Walk in     77 year old  Chief Complaint   Patient presents with     Forms     Medical examination forms       Blood pressure 120/73, pulse 68, temperature 97.7  F (36.5  C), temperature source Oral, height 1.61 m (5' 3.4\"), weight 95.3 kg (210 lb), SpO2 94 %, not currently breastfeeding. Body mass index is 36.73 kg/m .  BP completed using cuff size:        Patient Active Problem List   Diagnosis     Mature cataract     Age-related cataract of both eyes     Blepharoconjunctivitis     Dry eyes due to decreased tear production     Maico duct, cyst     Glucose intolerance (impaired glucose tolerance)     History of MRSA infection     Morbid obesity (H)     Myopia of both eyes with astigmatism and presbyopia     Nuclear sclerosis     Other and unspecified hyperlipidemia     Prediabetes     Restless legs syndrome (RLS)     Schizoaffective disorder, bipolar type (H)       Wt Readings from Last 2 Encounters:   08/16/23 95.3 kg (210 lb)   08/06/21 89.4 kg (197 lb)     BP Readings from Last 3 Encounters:   08/16/23 120/73   08/06/21 118/56   07/23/21 124/58       Allergies   Allergen Reactions     No Clinical Screening - See Comments      Pt does not know the name of medications that caused allergic reactions --she states to \"refer to the court documents\"  Pt also states she is \"allergic to all prescriptions because they put chemicals in them.\"       Current Outpatient Medications   Medication     carboxymethylcellulose PF (REFRESH PLUS) 0.5 % ophthalmic solution     diphenhydrAMINE (BENADRYL) 50 MG capsule     haloperidol decanoate (HALDOL DECANOATE) 50 MG/ML SOLN injection     ofloxacin (OCUFLOX) 0.3 % ophthalmic solution     prednisoLONE acetate (PRED FORTE) 1 % ophthalmic suspension     prednisoLONE acetate (PRED FORTE) 1 % ophthalmic suspension     senna (SENOKOT) 8.6 MG tablet     No current facility-administered medications " for this visit.       Social History     Tobacco Use     Smoking status: Former     Packs/day: 1.00     Years: 25.00     Pack years: 25.00     Types: Cigarettes     Quit date:      Years since quittin.6     Smokeless tobacco: Never     Tobacco comments:     started at age 13 yearsn with 1 cigarette a week in high school and college   Substance Use Topics     Alcohol use: No     Drug use: No       Honoring Choices - Health Care Directive Guide offered to patient at time of visit.    Health Maintenance Due   Topic Date Due     DEXA  Never done     ADVANCE CARE PLANNING  Never done     HEPATITIS C SCREENING  Never done     LIPID  Never done     ZOSTER IMMUNIZATION (1 of 2) Never done     Pneumococcal Vaccine: 65+ Years (2 - PCV) 2011     DTAP/TDAP/TD IMMUNIZATION (1 - Tdap) 2011     MEDICARE ANNUAL WELLNESS VISIT  2014     COVID-19 Vaccine (2 - Pfizer series) 10/26/2021     FALL RISK ASSESSMENT  2022     PHQ-2 (once per calendar year)  2023       There is no immunization history for the selected administration types on file for this patient.    No results found for: PAP    No lab results found.        2021    10:53 AM 2021    12:51 PM   PHQ-2 (  Pfizer)   Q1: Little interest or pleasure in doing things 0 0   Q2: Feeling down, depressed or hopeless 0 0   PHQ-2 Score 0 0   PHQ-2 Total Score (12-17 Years)- Positive if 3 or more points; Administer PHQ-A if positive 0 0            No data to display                     No data to display                     No data to display                Kostas Villalpando    2023 1:03 PM
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
## 2023-08-18 NOTE — RESULT ENCOUNTER NOTE
León Mejia,    This is the patient with behavioral concerns. Can we discuss follow-up on Monday?     ThanksAníbal

## 2023-08-23 ENCOUNTER — TELEPHONE (OUTPATIENT)
Dept: FAMILY MEDICINE | Facility: CLINIC | Age: 78
End: 2023-08-23
Payer: MEDICARE
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
## 2023-11-14 NOTE — PROGRESS NOTES
"SUBJECTIVE:   Jada Latham is a 77 year old female who presents for Preventive Visit.     {Patient advised of split billing (Optional):147270}  Are you in the first 12 months of your Medicare Part B coverage?  No    Physical Health:  In general, how would you rate your overall physical health? { :533481}  Outside of work, how many days during the week do you exercise? { :650335}  Outside of work, approximately how many minutes a day do you exercise?{ :298174}  If you drink alcohol do you typically have >3 drinks per day or >7 drinks per week? { :393628}  Do you usually eat at least 4 servings of fruit and vegetables a day, include whole grains & fiber and avoid regularly eating high fat or \"junk\" foods? { :616041}  Do you have any problems taking medications regularly?  { :853912}  Do you have any side effects from medications? { :150706}  Needs assistance for the following daily activities: { :922658}  Which of the following safety concerns are present in your home?  { :735368}   Hearing impairment: { :477686}  In the past 6 months, have you been bothered by leaking of urine? { :714706}    Hearing Screen  Left ear:  500Hz  ***  1000Hz  ***  2000Hz  ***  4000Hz  ***    Right ear:  500Hz  ***  1000Hz  ***  2000Hz  ***  4000Hz  ***    Mental Health:  In general, how would you rate your overall mental or emotional health? { :712799}  PHQ-2 Score:      Do you feel safe in your environment? { :423886}    Have you ever done Advance Care Planning? (For example, a Health Directive, POLST, or a discussion with a medical provider or your loved ones about your wishes): { :119510}    Additional concerns to address?  {If YES, notify patient they may be responsible for a copay, coinsurance or deductible if the visit today includes services such as checking on a sore throat, having an x-ray or lab test, or treating and evaluating a new or existing condition :395656}    Fall risk:  { :819812}  {If any of the above assessments are " answered yes, consider ordering appropriate referrals (Optional):698734}  Cognitive Screening: { :280360}    {Do you have sleep apnea, excessive snoring or daytime drowsiness? (Optional):048917}    {Outside tests to abstract? :505763}    {additional problems to add (Optional):104757}    Reviewed and updated as needed this visit by clinical staff                  Reviewed and updated as needed this visit by Provider                 Social History     Tobacco Use    Smoking status: Former     Packs/day: 1.00     Years: 25.00     Additional pack years: 0.00     Total pack years: 25.00     Types: Cigarettes     Quit date:      Years since quittin.8    Smokeless tobacco: Never    Tobacco comments:     started at age 13 yearsn with 1 cigarette a week in high school and college   Substance Use Topics    Alcohol use: No     {Rooming staff  Click this link to complete the Prescreen if response below is not for today's visit  Alcohol Use Prescreen >3 drinks/day or > 7 drinks/week.  If the prescreen question answer is YES, complete the full AUDIT  :603274}         No data to display            {add AUDIT responses (Optional) (A score of 7 for adult men is an indication of hazardous drinking; a score of 8 or more is an indication of an alcohol use disorder.  A score of 7 or more for adult women is an indication of hazardous drinking or an alchohol use disorder):105888}  Do you have a current opioid prescription? { :640415}  Do you use any other controlled substances or medications that are not prescribed by a provider? {Substance Use :793363}  {Provider  If there are gaps in the social history shown above, please follow the link and refresh the note Link to Social and Substance History :502418}                    Current providers sharing in care for this patient include: {Rooming staff:  Please update Care Team in Rooming Activity, refresh this note and then delete this statement}  Patient Care Team:  Bill  "LETICIA Baker CNP as PCP - General (Nurse Practitioner Primary Care)    The following health maintenance items are reviewed in Epic and correct as of today:  Health Maintenance   Topic Date Due    DEXA  Never done    ADVANCE CARE PLANNING  Never done    HEPATITIS C SCREENING  Never done    LIPID  Never done    ZOSTER IMMUNIZATION (1 of 2) Never done    RSV VACCINE (Pregnancy & 60+) (1 - 1-dose 60+ series) Never done    Pneumococcal Vaccine: 65+ Years (2 - PCV) 2011    DTAP/TDAP/TD IMMUNIZATION (1 - Tdap) 2011    FALL RISK ASSESSMENT  2022    PHQ-2 (once per calendar year)  2023    INFLUENZA VACCINE (1) 2023    COVID-19 Vaccine (2 - - season) 2023    MEDICARE ANNUAL WELLNESS VISIT  11/15/2024    IPV IMMUNIZATION  Aged Out    HPV IMMUNIZATION  Aged Out    MENINGITIS IMMUNIZATION  Aged Out    RSV MONOCLONAL ANTIBODY  Aged Out     {Chronicprobdata (Optional):360620}  {Decision Support (Optional):504286}    ROS:  {ROS COMP:157749}    OBJECTIVE:   There were no vitals taken for this visit. Estimated body mass index is 36.73 kg/m  as calculated from the following:    Height as of 23: 1.61 m (5' 3.4\").    Weight as of 23: 95.3 kg (210 lb).  EXAM:   {Exam :106430}    {Diagnostic Test Results (Optional):492022}    ASSESSMENT / PLAN:   {Diag Picklist:614996}    {Patient advised of split billing (Optional):520524}    COUNSELING:  {Medicare Counselin}    Estimated body mass index is 36.73 kg/m  as calculated from the following:    Height as of 23: 1.61 m (5' 3.4\").    Weight as of 23: 95.3 kg (210 lb).    {Weight Management Plan (ACO) Complete if BMI is abnormal-  Ages 18-64  BMI >24.9.  Age 65+ with BMI <23 or >30 (Optional):956215}    She reports that she quit smoking about 30 years ago. She has a 25.00 pack-year smoking history. She has never used smokeless tobacco.    {Medicare required documentation of substance and opioid use disorders screening " :111347}  Appropriate preventive services were discussed with this patient, including applicable screening as appropriate for cardiovascular disease, diabetes, osteopenia/osteoporosis, and glaucoma.  As appropriate for age/gender, discussed screening for colorectal cancer, prostate cancer, breast cancer, and cervical cancer. Checklist reviewing preventive services available has been given to the patient.    Reviewed patients plan of care and provided an AVS. The {CarePlan:672555} for Jada meets the Care Plan requirement. This Care Plan has been established and reviewed with the {PATIENT, FAMILY MEMBER, CAREGIVER:772189}.    Counseling Resources:  ATP IV Guidelines  Pooled Cohorts Equation Calculator  Breast Cancer Risk Calculator  BRCA-Related Cancer Risk Assessment: FHS-7 Tool  FRAX Risk Assessment  ICSI Preventive Guidelines  Dietary Guidelines for Americans, 2010  USDA's MyPlate  ASA Prophylaxis  Lung CA Screening    ELTICIA Thompson CNP  [facility]

## 2023-11-14 NOTE — PATIENT INSTRUCTIONS
Patient Education   Personalized Prevention Plan  You are due for the preventive services outlined below.  Your care team is available to assist you in scheduling these services.  If you have already completed any of these items, please share that information with your care team to update in your medical record.  Health Maintenance Due   Topic Date Due     Osteoporosis Screening  Never done     Discuss Advance Care Planning  Never done     Hepatitis C Screening  Never done     Cholesterol Lab  Never done     Zoster (Shingles) Vaccine (1 of 2) Never done     RSV VACCINE (Pregnancy & 60+) (1 - 1-dose 60+ series) Never done     Pneumococcal Vaccine (2 - PCV) 01/04/2011     Diptheria Tetanus Pertussis (DTAP/TDAP/TD) Vaccine (1 - Tdap) 02/06/2011     FALL RISK ASSESSMENT  07/21/2022     PHQ-2 (once per calendar year)  01/01/2023     Flu Vaccine (1) 09/01/2023     COVID-19 Vaccine (2 - 2023-24 season) 09/01/2023

## 2023-11-15 ENCOUNTER — OFFICE VISIT (OUTPATIENT)
Dept: FAMILY MEDICINE | Facility: CLINIC | Age: 78
End: 2023-11-15

## 2023-11-15 VITALS
DIASTOLIC BLOOD PRESSURE: 57 MMHG | BODY MASS INDEX: 35.86 KG/M2 | WEIGHT: 205 LBS | SYSTOLIC BLOOD PRESSURE: 120 MMHG | OXYGEN SATURATION: 96 % | HEART RATE: 64 BPM

## 2023-11-15 DIAGNOSIS — Z00.00 ENCOUNTER FOR MEDICARE ANNUAL WELLNESS EXAM: Primary | ICD-10-CM

## 2023-11-15 NOTE — PROGRESS NOTES
Seen together with Dr. Goff - patient expected to get a driving evaluation. Clear discussion about how that is not possible today and referred to Srinivas Haney for driving evaluation.       Encouraged patient to return if any new or worsening health concerns.

## 2023-11-16 ENCOUNTER — TELEPHONE (OUTPATIENT)
Dept: FAMILY MEDICINE | Facility: CLINIC | Age: 78
End: 2023-11-16
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
## 2023-11-17 NOTE — TELEPHONE ENCOUNTER
Pt would like to be seen at [place] if there is a provider that would be able to help out with the below request.

## (undated) DEVICE — EYE CANN IRR 27GA ANTERIOR CHAMBER 581280

## (undated) DEVICE — PACK CATARACT CUSTOM ASC SEY15CPUMC

## (undated) DEVICE — EYE CANN IRR 25GA CYSTOTOME 581610

## (undated) DEVICE — LINEN TOWEL PACK X5 5464

## (undated) DEVICE — EYE SHIELD PLASTIC

## (undated) DEVICE — EYE KNIFE STILETTO VISITEC 1.1MM ANG 45DEG SIDEPORT 376620

## (undated) DEVICE — SOL WATER IRRIG 500ML BOTTLE 2F7113

## (undated) DEVICE — EYE TIP IRRIGATION & ASPIRATION POLYMER CVD 0.3MM 8065751512

## (undated) DEVICE — EYE PACK CUSTOM ANTERIOR 30DEG TIP CENTURION PPK6682-04

## (undated) DEVICE — EYE KNIFE SLIT XSTAR VISITEC 2.6MM 45DEG 373726

## (undated) DEVICE — GLOVE PROTEXIS MICRO 6.5  2D73PM65

## (undated) RX ORDER — LIDOCAINE HYDROCHLORIDE 20 MG/ML
INJECTION, SOLUTION EPIDURAL; INFILTRATION; INTRACAUDAL; PERINEURAL
Status: DISPENSED
Start: 2021-08-06

## (undated) RX ORDER — PROPOFOL 10 MG/ML
INJECTION, EMULSION INTRAVENOUS
Status: DISPENSED
Start: 2021-07-23

## (undated) RX ORDER — FENTANYL CITRATE 50 UG/ML
INJECTION, SOLUTION INTRAMUSCULAR; INTRAVENOUS
Status: DISPENSED
Start: 2021-07-23

## (undated) RX ORDER — PROPOFOL 10 MG/ML
INJECTION, EMULSION INTRAVENOUS
Status: DISPENSED
Start: 2021-08-06

## (undated) RX ORDER — ACETAMINOPHEN 325 MG/1
TABLET ORAL
Status: DISPENSED
Start: 2021-08-06

## (undated) RX ORDER — LIDOCAINE HYDROCHLORIDE 20 MG/ML
INJECTION, SOLUTION EPIDURAL; INFILTRATION; INTRACAUDAL; PERINEURAL
Status: DISPENSED
Start: 2021-07-23